# Patient Record
Sex: FEMALE | Race: BLACK OR AFRICAN AMERICAN | NOT HISPANIC OR LATINO | URBAN - METROPOLITAN AREA
[De-identification: names, ages, dates, MRNs, and addresses within clinical notes are randomized per-mention and may not be internally consistent; named-entity substitution may affect disease eponyms.]

---

## 2021-06-21 ENCOUNTER — IMMUNIZATION (OUTPATIENT)
Dept: PRIMARY CARE CLINIC | Facility: CLINIC | Age: 61
End: 2021-06-21
Payer: OTHER GOVERNMENT

## 2021-06-21 DIAGNOSIS — Z23 NEED FOR VACCINATION: Primary | ICD-10-CM

## 2021-06-21 PROCEDURE — 91300 COVID-19, MRNA, LNP-S, PF, 30 MCG/0.3 ML DOSE VACCINE: ICD-10-PCS | Mod: ,,, | Performed by: FAMILY MEDICINE

## 2021-06-21 PROCEDURE — 0001A COVID-19, MRNA, LNP-S, PF, 30 MCG/0.3 ML DOSE VACCINE: ICD-10-PCS | Mod: CV19,,, | Performed by: FAMILY MEDICINE

## 2021-06-21 PROCEDURE — 0001A COVID-19, MRNA, LNP-S, PF, 30 MCG/0.3 ML DOSE VACCINE: CPT | Mod: CV19,,, | Performed by: FAMILY MEDICINE

## 2021-06-21 PROCEDURE — 91300 COVID-19, MRNA, LNP-S, PF, 30 MCG/0.3 ML DOSE VACCINE: CPT | Mod: ,,, | Performed by: FAMILY MEDICINE

## 2021-07-13 ENCOUNTER — IMMUNIZATION (OUTPATIENT)
Dept: INTERNAL MEDICINE | Facility: CLINIC | Age: 61
End: 2021-07-13
Payer: OTHER GOVERNMENT

## 2021-07-13 DIAGNOSIS — Z23 NEED FOR VACCINATION: Primary | ICD-10-CM

## 2021-07-13 PROCEDURE — 91300 COVID-19, MRNA, LNP-S, PF, 30 MCG/0.3 ML DOSE VACCINE: CPT | Mod: ,,, | Performed by: FAMILY MEDICINE

## 2021-07-13 PROCEDURE — 0002A COVID-19, MRNA, LNP-S, PF, 30 MCG/0.3 ML DOSE VACCINE: ICD-10-PCS | Mod: CV19,,, | Performed by: FAMILY MEDICINE

## 2021-07-13 PROCEDURE — 0002A COVID-19, MRNA, LNP-S, PF, 30 MCG/0.3 ML DOSE VACCINE: CPT | Mod: CV19,,, | Performed by: FAMILY MEDICINE

## 2021-07-13 PROCEDURE — 91300 COVID-19, MRNA, LNP-S, PF, 30 MCG/0.3 ML DOSE VACCINE: ICD-10-PCS | Mod: ,,, | Performed by: FAMILY MEDICINE

## 2022-10-10 ENCOUNTER — PATIENT MESSAGE (OUTPATIENT)
Dept: RESEARCH | Facility: HOSPITAL | Age: 62
End: 2022-10-10

## 2025-02-03 ENCOUNTER — HOSPITAL ENCOUNTER (OUTPATIENT)
Facility: HOSPITAL | Age: 65
Discharge: HOME | End: 2025-02-04
Attending: EMERGENCY MEDICINE | Admitting: HOSPITALIST
Payer: COMMERCIAL

## 2025-02-03 DIAGNOSIS — Z91.148 NONCOMPLIANCE WITH DIET AND MEDICATION REGIMEN: ICD-10-CM

## 2025-02-03 DIAGNOSIS — E86.1 INTRAVASCULAR VOLUME DEPLETION: ICD-10-CM

## 2025-02-03 DIAGNOSIS — T73.0XXA STARVATION KETOACIDOSIS: ICD-10-CM

## 2025-02-03 DIAGNOSIS — R73.9 HYPERGLYCEMIA: ICD-10-CM

## 2025-02-03 DIAGNOSIS — E87.29 STARVATION KETOACIDOSIS: ICD-10-CM

## 2025-02-03 DIAGNOSIS — E11.65 HYPERGLYCEMIA DUE TO DIABETES MELLITUS: ICD-10-CM

## 2025-02-03 DIAGNOSIS — E11.65 TYPE 2 DIABETES MELLITUS WITH HYPERGLYCEMIA, WITH LONG-TERM CURRENT USE OF INSULIN: ICD-10-CM

## 2025-02-03 DIAGNOSIS — Z91.199 NONCOMPLIANCE WITH DIET AND MEDICATION REGIMEN: ICD-10-CM

## 2025-02-03 DIAGNOSIS — E87.20 METABOLIC ACIDOSIS, NAG, BICARBONATE LOSSES: Primary | ICD-10-CM

## 2025-02-03 DIAGNOSIS — R07.9 CHEST PAIN: ICD-10-CM

## 2025-02-03 DIAGNOSIS — R11.2 NAUSEA & VOMITING: ICD-10-CM

## 2025-02-03 DIAGNOSIS — Z79.4 TYPE 2 DIABETES MELLITUS WITH HYPERGLYCEMIA, WITH LONG-TERM CURRENT USE OF INSULIN: ICD-10-CM

## 2025-02-03 LAB
ALBUMIN SERPL BCP-MCNC: 4.2 G/DL (ref 3.5–5.2)
ALLENS TEST: ABNORMAL
ALP SERPL-CCNC: 67 U/L (ref 40–150)
ALT SERPL W/O P-5'-P-CCNC: 16 U/L (ref 10–44)
AMPHET+METHAMPHET UR QL: NEGATIVE
ANION GAP SERPL CALC-SCNC: 18 MMOL/L (ref 8–16)
ANION GAP SERPL CALC-SCNC: 23 MMOL/L (ref 8–16)
AST SERPL-CCNC: 11 U/L (ref 10–40)
B-OH-BUTYR BLD STRIP-SCNC: 0.1 MMOL/L (ref 0–0.5)
BACTERIA #/AREA URNS HPF: ABNORMAL /HPF
BARBITURATES UR QL SCN>200 NG/ML: NEGATIVE
BASOPHILS # BLD AUTO: 0.04 K/UL (ref 0–0.2)
BASOPHILS NFR BLD: 0.5 % (ref 0–1.9)
BENZODIAZ UR QL SCN>200 NG/ML: NEGATIVE
BILIRUB SERPL-MCNC: 0.2 MG/DL (ref 0.1–1)
BILIRUB UR QL STRIP: NEGATIVE
BUN SERPL-MCNC: 10 MG/DL (ref 8–23)
BUN SERPL-MCNC: 13 MG/DL (ref 8–23)
BZE UR QL SCN: NEGATIVE
CALCIUM SERPL-MCNC: 8.3 MG/DL (ref 8.7–10.5)
CALCIUM SERPL-MCNC: 8.9 MG/DL (ref 8.7–10.5)
CANNABINOIDS UR QL SCN: NEGATIVE
CAOX CRY URNS QL MICRO: ABNORMAL
CHLORIDE SERPL-SCNC: 102 MMOL/L (ref 95–110)
CHLORIDE SERPL-SCNC: 112 MMOL/L (ref 95–110)
CLARITY UR: CLEAR
CO2 SERPL-SCNC: 11 MMOL/L (ref 23–29)
CO2 SERPL-SCNC: 9 MMOL/L (ref 23–29)
COLOR UR: COLORLESS
CREAT SERPL-MCNC: 0.8 MG/DL (ref 0.5–1.4)
CREAT SERPL-MCNC: 1.1 MG/DL (ref 0.5–1.4)
CREAT UR-MCNC: 18.2 MG/DL (ref 15–325)
DELSYS: ABNORMAL
DIFFERENTIAL METHOD BLD: ABNORMAL
EOSINOPHIL # BLD AUTO: 0 K/UL (ref 0–0.5)
EOSINOPHIL NFR BLD: 0.1 % (ref 0–8)
ERYTHROCYTE [DISTWIDTH] IN BLOOD BY AUTOMATED COUNT: 13.2 % (ref 11.5–14.5)
EST. GFR  (NO RACE VARIABLE): 56 ML/MIN/1.73 M^2
EST. GFR  (NO RACE VARIABLE): >60 ML/MIN/1.73 M^2
ETHANOL SERPL-MCNC: <10 MG/DL
FIO2: 21
GLUCOSE SERPL-MCNC: 213 MG/DL (ref 70–110)
GLUCOSE SERPL-MCNC: 437 MG/DL (ref 70–110)
GLUCOSE SERPL-MCNC: 444 MG/DL (ref 70–110)
GLUCOSE UR QL STRIP: ABNORMAL
HCO3 UR-SCNC: 13.2 MMOL/L (ref 24–28)
HCT VFR BLD AUTO: 39.6 % (ref 37–48.5)
HCT VFR BLD CALC: 39 %PCV (ref 36–54)
HGB BLD-MCNC: 12.8 G/DL (ref 12–16)
HGB UR QL STRIP: NEGATIVE
IMM GRANULOCYTES # BLD AUTO: 0.05 K/UL (ref 0–0.04)
IMM GRANULOCYTES NFR BLD AUTO: 0.6 % (ref 0–0.5)
KETONES UR QL STRIP: NEGATIVE
LACTATE SERPL-SCNC: 2.7 MMOL/L (ref 0.5–2.2)
LEUKOCYTE ESTERASE UR QL STRIP: NEGATIVE
LIPASE SERPL-CCNC: 21 U/L (ref 4–60)
LYMPHOCYTES # BLD AUTO: 1.5 K/UL (ref 1–4.8)
LYMPHOCYTES NFR BLD: 17.6 % (ref 18–48)
MAGNESIUM SERPL-MCNC: 1.8 MG/DL (ref 1.6–2.6)
MCH RBC QN AUTO: 29.7 PG (ref 27–31)
MCHC RBC AUTO-ENTMCNC: 32.3 G/DL (ref 32–36)
MCV RBC AUTO: 92 FL (ref 82–98)
METHADONE UR QL SCN>300 NG/ML: NEGATIVE
MICROSCOPIC COMMENT: ABNORMAL
MODE: ABNORMAL
MONOCYTES # BLD AUTO: 0.4 K/UL (ref 0.3–1)
MONOCYTES NFR BLD: 4.7 % (ref 4–15)
NEUTROPHILS # BLD AUTO: 6.4 K/UL (ref 1.8–7.7)
NEUTROPHILS NFR BLD: 76.5 % (ref 38–73)
NITRITE UR QL STRIP: NEGATIVE
NRBC BLD-RTO: 0 /100 WBC
OHS QRS DURATION: 120 MS
OHS QTC CALCULATION: 533 MS
OPIATES UR QL SCN: NEGATIVE
PCO2 BLDA: 26.8 MMHG (ref 35–45)
PCP UR QL SCN>25 NG/ML: NEGATIVE
PH SMN: 7.3 [PH] (ref 7.35–7.45)
PH UR STRIP: 5 [PH] (ref 5–8)
PLATELET # BLD AUTO: 325 K/UL (ref 150–450)
PMV BLD AUTO: 10 FL (ref 9.2–12.9)
PO2 BLDA: 58 MMHG (ref 40–60)
POC BE: -13 MMOL/L
POC IONIZED CALCIUM: 1.14 MMOL/L (ref 1.06–1.42)
POC SATURATED O2: 87 % (ref 95–100)
POCT GLUCOSE: 105 MG/DL (ref 70–110)
POCT GLUCOSE: 147 MG/DL (ref 70–110)
POCT GLUCOSE: 215 MG/DL (ref 70–110)
POCT GLUCOSE: 421 MG/DL (ref 70–110)
POCT GLUCOSE: 475 MG/DL (ref 70–110)
POTASSIUM BLD-SCNC: 4.5 MMOL/L (ref 3.5–5.1)
POTASSIUM SERPL-SCNC: 4.5 MMOL/L (ref 3.5–5.1)
POTASSIUM SERPL-SCNC: 5 MMOL/L (ref 3.5–5.1)
PROT SERPL-MCNC: 8 G/DL (ref 6–8.4)
PROT UR QL STRIP: NEGATIVE
RBC # BLD AUTO: 4.31 M/UL (ref 4–5.4)
SAMPLE: ABNORMAL
SITE: ABNORMAL
SODIUM BLD-SCNC: 136 MMOL/L (ref 136–145)
SODIUM SERPL-SCNC: 136 MMOL/L (ref 136–145)
SODIUM SERPL-SCNC: 139 MMOL/L (ref 136–145)
SP GR UR STRIP: 1.01 (ref 1–1.03)
T4 FREE SERPL-MCNC: 1.43 NG/DL (ref 0.71–1.51)
TOXICOLOGY INFORMATION: NORMAL
TSH SERPL DL<=0.005 MIU/L-ACNC: <0.01 UIU/ML (ref 0.4–4)
URN SPEC COLLECT METH UR: ABNORMAL
UROBILINOGEN UR STRIP-ACNC: NEGATIVE EU/DL
WBC # BLD AUTO: 8.36 K/UL (ref 3.9–12.7)
WBC #/AREA URNS HPF: 1 /HPF (ref 0–5)
YEAST URNS QL MICRO: ABNORMAL

## 2025-02-03 PROCEDURE — 96372 THER/PROPH/DIAG INJ SC/IM: CPT | Performed by: EMERGENCY MEDICINE

## 2025-02-03 PROCEDURE — 96361 HYDRATE IV INFUSION ADD-ON: CPT

## 2025-02-03 PROCEDURE — G0378 HOSPITAL OBSERVATION PER HR: HCPCS

## 2025-02-03 PROCEDURE — 82962 GLUCOSE BLOOD TEST: CPT

## 2025-02-03 PROCEDURE — 82330 ASSAY OF CALCIUM: CPT

## 2025-02-03 PROCEDURE — 80048 BASIC METABOLIC PNL TOTAL CA: CPT | Mod: XB | Performed by: EMERGENCY MEDICINE

## 2025-02-03 PROCEDURE — 25500020 PHARM REV CODE 255: Performed by: HOSPITALIST

## 2025-02-03 PROCEDURE — 83605 ASSAY OF LACTIC ACID: CPT | Performed by: EMERGENCY MEDICINE

## 2025-02-03 PROCEDURE — 96375 TX/PRO/DX INJ NEW DRUG ADDON: CPT

## 2025-02-03 PROCEDURE — 83036 HEMOGLOBIN GLYCOSYLATED A1C: CPT | Performed by: EMERGENCY MEDICINE

## 2025-02-03 PROCEDURE — 63600175 PHARM REV CODE 636 W HCPCS: Performed by: EMERGENCY MEDICINE

## 2025-02-03 PROCEDURE — 36415 COLL VENOUS BLD VENIPUNCTURE: CPT | Performed by: EMERGENCY MEDICINE

## 2025-02-03 PROCEDURE — 93005 ELECTROCARDIOGRAM TRACING: CPT

## 2025-02-03 PROCEDURE — 25000003 PHARM REV CODE 250: Performed by: NURSE PRACTITIONER

## 2025-02-03 PROCEDURE — 84443 ASSAY THYROID STIM HORMONE: CPT | Performed by: EMERGENCY MEDICINE

## 2025-02-03 PROCEDURE — 99900035 HC TECH TIME PER 15 MIN (STAT)

## 2025-02-03 PROCEDURE — 84295 ASSAY OF SERUM SODIUM: CPT

## 2025-02-03 PROCEDURE — 83690 ASSAY OF LIPASE: CPT | Performed by: EMERGENCY MEDICINE

## 2025-02-03 PROCEDURE — 84132 ASSAY OF SERUM POTASSIUM: CPT

## 2025-02-03 PROCEDURE — 82803 BLOOD GASES ANY COMBINATION: CPT

## 2025-02-03 PROCEDURE — 25500020 PHARM REV CODE 255: Performed by: EMERGENCY MEDICINE

## 2025-02-03 PROCEDURE — 93010 ELECTROCARDIOGRAM REPORT: CPT | Mod: ,,, | Performed by: INTERNAL MEDICINE

## 2025-02-03 PROCEDURE — 63600175 PHARM REV CODE 636 W HCPCS: Performed by: NURSE PRACTITIONER

## 2025-02-03 PROCEDURE — 96372 THER/PROPH/DIAG INJ SC/IM: CPT | Performed by: NURSE PRACTITIONER

## 2025-02-03 PROCEDURE — 82010 KETONE BODYS QUAN: CPT | Performed by: EMERGENCY MEDICINE

## 2025-02-03 PROCEDURE — 83735 ASSAY OF MAGNESIUM: CPT | Performed by: EMERGENCY MEDICINE

## 2025-02-03 PROCEDURE — 25000003 PHARM REV CODE 250: Performed by: EMERGENCY MEDICINE

## 2025-02-03 PROCEDURE — 81000 URINALYSIS NONAUTO W/SCOPE: CPT | Performed by: EMERGENCY MEDICINE

## 2025-02-03 PROCEDURE — 80053 COMPREHEN METABOLIC PANEL: CPT | Performed by: EMERGENCY MEDICINE

## 2025-02-03 PROCEDURE — 85025 COMPLETE CBC W/AUTO DIFF WBC: CPT | Performed by: EMERGENCY MEDICINE

## 2025-02-03 PROCEDURE — 82077 ASSAY SPEC XCP UR&BREATH IA: CPT | Performed by: EMERGENCY MEDICINE

## 2025-02-03 PROCEDURE — 84439 ASSAY OF FREE THYROXINE: CPT | Performed by: EMERGENCY MEDICINE

## 2025-02-03 PROCEDURE — 85014 HEMATOCRIT: CPT

## 2025-02-03 PROCEDURE — 99285 EMERGENCY DEPT VISIT HI MDM: CPT | Mod: 25

## 2025-02-03 PROCEDURE — 80307 DRUG TEST PRSMV CHEM ANLYZR: CPT | Performed by: EMERGENCY MEDICINE

## 2025-02-03 PROCEDURE — 96374 THER/PROPH/DIAG INJ IV PUSH: CPT | Mod: 59

## 2025-02-03 RX ORDER — ENOXAPARIN SODIUM 100 MG/ML
40 INJECTION SUBCUTANEOUS EVERY 24 HOURS
Status: DISCONTINUED | OUTPATIENT
Start: 2025-02-03 | End: 2025-02-04 | Stop reason: HOSPADM

## 2025-02-03 RX ORDER — FUROSEMIDE 20 MG/1
1 TABLET ORAL DAILY
COMMUNITY
Start: 2024-12-05

## 2025-02-03 RX ORDER — TIRZEPATIDE 2.5 MG/.5ML
2.5 INJECTION, SOLUTION SUBCUTANEOUS
COMMUNITY
Start: 2024-05-13

## 2025-02-03 RX ORDER — IBUPROFEN 200 MG
24 TABLET ORAL
Status: DISCONTINUED | OUTPATIENT
Start: 2025-02-03 | End: 2025-02-04 | Stop reason: HOSPADM

## 2025-02-03 RX ORDER — SODIUM CHLORIDE 0.9 % (FLUSH) 0.9 %
3 SYRINGE (ML) INJECTION EVERY 12 HOURS PRN
Status: DISCONTINUED | OUTPATIENT
Start: 2025-02-03 | End: 2025-02-04 | Stop reason: HOSPADM

## 2025-02-03 RX ORDER — IBUPROFEN 200 MG
16 TABLET ORAL
Status: DISCONTINUED | OUTPATIENT
Start: 2025-02-03 | End: 2025-02-04 | Stop reason: HOSPADM

## 2025-02-03 RX ORDER — ACETAMINOPHEN 650 MG/1
650 SUPPOSITORY RECTAL EVERY 6 HOURS PRN
Status: DISCONTINUED | OUTPATIENT
Start: 2025-02-03 | End: 2025-02-04 | Stop reason: HOSPADM

## 2025-02-03 RX ORDER — ALUMINUM HYDROXIDE, MAGNESIUM HYDROXIDE, AND SIMETHICONE 1200; 120; 1200 MG/30ML; MG/30ML; MG/30ML
30 SUSPENSION ORAL 4 TIMES DAILY PRN
Status: DISCONTINUED | OUTPATIENT
Start: 2025-02-03 | End: 2025-02-04 | Stop reason: HOSPADM

## 2025-02-03 RX ORDER — LISINOPRIL 5 MG/1
1 TABLET ORAL DAILY
COMMUNITY
Start: 2024-12-03

## 2025-02-03 RX ORDER — GLUCAGON 1 MG
1 KIT INJECTION
Status: DISCONTINUED | OUTPATIENT
Start: 2025-02-03 | End: 2025-02-04 | Stop reason: HOSPADM

## 2025-02-03 RX ORDER — SIMETHICONE 80 MG
1 TABLET,CHEWABLE ORAL 4 TIMES DAILY PRN
Status: DISCONTINUED | OUTPATIENT
Start: 2025-02-03 | End: 2025-02-04 | Stop reason: HOSPADM

## 2025-02-03 RX ORDER — ACETAMINOPHEN 650 MG/1
650 SUPPOSITORY RECTAL EVERY 6 HOURS PRN
Status: DISCONTINUED | OUTPATIENT
Start: 2025-02-03 | End: 2025-02-03 | Stop reason: SDUPTHER

## 2025-02-03 RX ORDER — PROMETHAZINE HYDROCHLORIDE 25 MG/1
25 TABLET ORAL EVERY 6 HOURS PRN
Status: DISCONTINUED | OUTPATIENT
Start: 2025-02-03 | End: 2025-02-04 | Stop reason: HOSPADM

## 2025-02-03 RX ORDER — LEVOTHYROXINE SODIUM 125 UG/1
1 TABLET ORAL EVERY MORNING
COMMUNITY
Start: 2024-07-22

## 2025-02-03 RX ORDER — METFORMIN HYDROCHLORIDE 500 MG/1
1000 TABLET, EXTENDED RELEASE ORAL 2 TIMES DAILY WITH MEALS
COMMUNITY
Start: 2024-08-23

## 2025-02-03 RX ORDER — SODIUM CHLORIDE 9 MG/ML
1000 INJECTION, SOLUTION INTRAVENOUS
Status: COMPLETED | OUTPATIENT
Start: 2025-02-03 | End: 2025-02-03

## 2025-02-03 RX ORDER — AMLODIPINE BESYLATE 5 MG/1
1 TABLET ORAL DAILY
COMMUNITY
Start: 2024-12-03

## 2025-02-03 RX ORDER — POLYETHYLENE GLYCOL 3350 17 G/17G
17 POWDER, FOR SOLUTION ORAL DAILY PRN
Status: DISCONTINUED | OUTPATIENT
Start: 2025-02-03 | End: 2025-02-04 | Stop reason: HOSPADM

## 2025-02-03 RX ORDER — ACETAMINOPHEN 325 MG/1
650 TABLET ORAL EVERY 6 HOURS PRN
Status: DISCONTINUED | OUTPATIENT
Start: 2025-02-03 | End: 2025-02-04 | Stop reason: HOSPADM

## 2025-02-03 RX ORDER — NALOXONE HCL 0.4 MG/ML
0.02 VIAL (ML) INJECTION
Status: DISCONTINUED | OUTPATIENT
Start: 2025-02-03 | End: 2025-02-04 | Stop reason: HOSPADM

## 2025-02-03 RX ORDER — TALC
6 POWDER (GRAM) TOPICAL NIGHTLY PRN
Status: DISCONTINUED | OUTPATIENT
Start: 2025-02-03 | End: 2025-02-04 | Stop reason: HOSPADM

## 2025-02-03 RX ORDER — INSULIN ASPART 100 [IU]/ML
0-5 INJECTION, SOLUTION INTRAVENOUS; SUBCUTANEOUS
Status: DISCONTINUED | OUTPATIENT
Start: 2025-02-03 | End: 2025-02-04 | Stop reason: HOSPADM

## 2025-02-03 RX ORDER — INDOMETHACIN 25 MG/1
100 CAPSULE ORAL
Status: COMPLETED | OUTPATIENT
Start: 2025-02-03 | End: 2025-02-03

## 2025-02-03 RX ORDER — SODIUM CHLORIDE 9 MG/ML
INJECTION, SOLUTION INTRAVENOUS CONTINUOUS
Status: DISCONTINUED | OUTPATIENT
Start: 2025-02-03 | End: 2025-02-04 | Stop reason: HOSPADM

## 2025-02-03 RX ORDER — ONDANSETRON HYDROCHLORIDE 2 MG/ML
4 INJECTION, SOLUTION INTRAVENOUS EVERY 8 HOURS PRN
Status: DISCONTINUED | OUTPATIENT
Start: 2025-02-03 | End: 2025-02-04 | Stop reason: HOSPADM

## 2025-02-03 RX ADMIN — HUMAN INSULIN 8 UNITS: 100 INJECTION, SOLUTION SUBCUTANEOUS at 01:02

## 2025-02-03 RX ADMIN — ENOXAPARIN SODIUM 40 MG: 40 INJECTION SUBCUTANEOUS at 10:02

## 2025-02-03 RX ADMIN — IOHEXOL 100 ML: 350 INJECTION, SOLUTION INTRAVENOUS at 05:02

## 2025-02-03 RX ADMIN — SODIUM CHLORIDE 2000 ML: 9 INJECTION, SOLUTION INTRAVENOUS at 12:02

## 2025-02-03 RX ADMIN — SODIUM BICARBONATE 100 MEQ: 84 INJECTION, SOLUTION INTRAVENOUS at 05:02

## 2025-02-03 RX ADMIN — IOHEXOL 100 ML: 350 INJECTION, SOLUTION INTRAVENOUS at 08:02

## 2025-02-03 RX ADMIN — SODIUM CHLORIDE 1000 ML: 9 INJECTION, SOLUTION INTRAVENOUS at 05:02

## 2025-02-03 RX ADMIN — SODIUM CHLORIDE: 9 INJECTION, SOLUTION INTRAVENOUS at 09:02

## 2025-02-03 NOTE — PHARMACY MED REC
"Admission Medication History     The home medication history was taken by Jass Slaughter.    You may go to "Admission" then "Reconcile Home Medications" tabs to review and/or act upon these items.     The home medication list has been updated by the Pharmacy department.   Please read ALL comments highlighted in yellow.   Please address this information as you see fit.    Feel free to contact us if you have any questions or require assistance.      Medications listed below were obtained from: Patient/family and Analytic software- [employer]  (Not in a hospital admission)      Jass Slaughter  [id number]    Current Outpatient Medications on File Prior to Encounter   Medication Sig Dispense Refill Last Dose/Taking    amLODIPine (NORVASC) 5 MG tablet Take 1 tablet by mouth once daily.   2/3/2025    furosemide (LASIX) 20 MG tablet Take 1 tablet by mouth once daily.   2/3/2025    insulin detemir U-100 (LEVEMIR) 100 unit/mL injection Inject 25 Units into the skin 2 (two) times a day.   2/2/2025    levothyroxine (SYNTHROID) 125 MCG tablet Take 1 tablet by mouth every morning.   2/3/2025    lisinopriL (PRINIVIL,ZESTRIL) 5 MG tablet Take 1 tablet by mouth once daily.   2/3/2025    metFORMIN (GLUCOPHAGE-XR) 500 MG ER 24hr tablet Take 1,000 mg by mouth 2 (two) times daily with meals.   2/3/2025    tirzepatide (MOUNJARO) 2.5 mg/0.5 mL PnIj Inject 2.5 mg into the skin every 7 days.   Past Week                         .          "

## 2025-02-03 NOTE — ED PROVIDER NOTES
"SCRIBE #1 NOTE: I, Jaime Bishop, am scribing for, and in the presence of, Jaleesa Marc MD. I have scribed the entire note.       History     Chief Complaint   Patient presents with    Hyperglycemia     Pt. Reports her CBG was 568 this morning and she was having weakness.      Review of patient's allergies indicates:  No Known Allergies      History of Present Illness     HPI    2/3/2025, 12:27 PM  History obtained from the patient and sister at bedside      History of Present Illness: Birgit Tejada is a 64 y.o. female patient with a PMHx of DM and HTN who presents to the Emergency Department for evaluation of elevated blood glucose which onset gradually throughout the past day. Pt's blood glucose was over 500 this morning; pt reports glucose has been running high since yesterday. Pt's last insulin intake was yesterday night. Reports being unable to take insulin this morning. Pt denies eating any food, however, she did drink root beer and coffee prior to going to work at [employer]. Pt reports that her blood glucose normally runs in the 200s. Symptoms are constant and moderate in severity. No mitigating or exacerbating factors reported. Associated sxs include dizziness and described it as feeling "drunk." Pt denies drinking any EtOH this morning. Pt reports she did drink wine and only ate crawfish yesterday. Patient denies any dysuria, abd pain, fever, CP, SOB, N/V, and all other sxs at this time. No further complaints or concerns at this time.       Arrival mode: Personal vehicle      PCP: Ryan Gu MD        Past Medical History:  Past Medical History:   Diagnosis Date    Diabetes mellitus     Hypertension        Past Surgical History:  History reviewed. No pertinent surgical history.      Family History:  No family history on file.    Social History:  Social History     Tobacco Use    Smoking status: Never    Smokeless tobacco: Never   Substance and Sexual Activity    Alcohol use: Yes     " Comment: Occasional    Drug use: Never    Sexual activity: Not on file        Review of Systems     Review of Systems   Constitutional:  Negative for chills and fever.   HENT:  Negative for congestion and sore throat.    Respiratory:  Negative for cough and shortness of breath.    Cardiovascular:  Negative for chest pain.   Gastrointestinal:  Negative for abdominal pain, nausea and vomiting.   Genitourinary:  Negative for dysuria.   Musculoskeletal:  Negative for back pain.   Skin:  Negative for rash.   Neurological:  Positive for dizziness. Negative for weakness, numbness and headaches.   Hematological:  Does not bruise/bleed easily.   All other systems reviewed and are negative.       Physical Exam     Initial Vitals [02/03/25 1108]   BP Pulse Resp Temp SpO2   137/79 (!) 128 18 98.3 °F (36.8 °C) 97 %      MAP       --          Physical Exam  Nursing Notes and Vital Signs Reviewed.  Constitutional: Patient is in no acute distress. Intermittently spitting in emesis bag but no actual emesis.  Head: Atraumatic. Normocephalic.  Eyes: PERRL. EOM intact. Conjunctivae are not pale. No scleral icterus.  ENT: Mucous membranes are moist. Oropharynx is clear and symmetric.    Neck: Supple. Full ROM. No lymphadenopathy.  Cardiovascular: Tachycardic. Regular rhythm. No murmurs, rubs, or gallops. Distal pulses are 2+ and symmetric.  Pulmonary/Chest: No respiratory distress. Clear to auscultation bilaterally. No wheezing or rales.  Abdominal: Soft and non-distended.  There is no tenderness.  No rebound, guarding, or rigidity. Good bowel sounds.  Genitourinary: No CVA tenderness  Musculoskeletal: Moves all extremities. No obvious deformities. No edema. No calf tenderness.  Skin: Warm and dry.  Neurological:  Alert, awake, and appropriate.  Normal speech.  No pronator drift. Equal  strength. Tongue is midline. No facial droop. No acute focal neurological deficits are appreciated.  Psychiatric: Normal affect. Good eye contact.  "Appropriate in content.     ED Course   Critical Care    Date/Time: 2/3/2025 7:00 PM    Performed by: Jaleesa Marc MD  Authorized by: Jaleesa Marc MD  Direct patient critical care time: 50 minutes  Additional history critical care time: 6 minutes  Ordering / reviewing critical care time: 7 minutes  Documentation critical care time: 7 minutes  Consulting other physicians critical care time: 8 minutes  Total critical care time (exclusive of procedural time) : 78 minutes  Critical care was necessary to treat or prevent imminent or life-threatening deterioration of the following conditions: dehydration, metabolic crisis and endocrine crisis.  Critical care was time spent personally by me on the following activities: blood draw for specimens, discussions with consultants, discussions with primary provider, development of treatment plan with patient or surrogate, interpretation of cardiac output measurements, evaluation of patient's response to treatment, examination of patient, obtaining history from patient or surrogate, ordering and performing treatments and interventions, ordering and review of laboratory studies, ordering and review of radiographic studies, pulse oximetry, re-evaluation of patient's condition and review of old charts.        ED Vital Signs:  Vitals:    02/03/25 1902 02/03/25 1946 02/03/25 1958 02/03/25 2058   BP: (!) 141/82  (!) 150/90 (!) 143/80   Pulse: 94  89 84   Resp:   (!) 23 17   Temp:       TempSrc:       SpO2: 98%  97% 97%   Weight:       Height:  5' 7" (1.702 m)      02/03/25 2139 02/03/25 2145 02/03/25 2300 02/04/25 0023   BP: 135/66   136/71   Pulse: 92 82 86 88   Resp: 18   18   Temp: 98.5 °F (36.9 °C)   98.2 °F (36.8 °C)   TempSrc:    Oral   SpO2: 97%   95%   Weight:       Height:        02/04/25 0100 02/04/25 0331 02/04/25 0422 02/04/25 0500   BP:       Pulse: 86 93 110 86   Resp:       Temp:       TempSrc:       SpO2:       Weight:       Height:        02/04/25 0530 02/04/25 " 0809 02/04/25 1024   BP: 124/78 (!) 152/76    Pulse: 99 89 92   Resp: 16 16    Temp: 98 °F (36.7 °C) 98.6 °F (37 °C)    TempSrc: Oral Oral    SpO2: 98% 99% 97%   Weight:      Height:          Abnormal Lab Results:  Labs Reviewed   CBC W/ AUTO DIFFERENTIAL - Abnormal       Result Value    WBC 8.36      RBC 4.31      Hemoglobin 12.8      Hematocrit 39.6      MCV 92      MCH 29.7      MCHC 32.3      RDW 13.2      Platelets 325      MPV 10.0      Immature Granulocytes 0.6 (*)     Gran # (ANC) 6.4      Immature Grans (Abs) 0.05 (*)     Lymph # 1.5      Mono # 0.4      Eos # 0.0      Baso # 0.04      nRBC 0      Gran % 76.5 (*)     Lymph % 17.6 (*)     Mono % 4.7      Eosinophil % 0.1      Basophil % 0.5      Differential Method Automated     COMPREHENSIVE METABOLIC PANEL - Abnormal    Sodium 136      Potassium 4.5      Chloride 102      CO2 11 (*)     Glucose 444 (*)     BUN 13      Creatinine 1.1      Calcium 8.9      Total Protein 8.0      Albumin 4.2      Total Bilirubin 0.2      Alkaline Phosphatase 67      AST 11      ALT 16      eGFR 56 (*)     Anion Gap 23 (*)    URINALYSIS, REFLEX TO URINE CULTURE - Abnormal    Specimen UA Urine, Clean Catch      Color, UA Colorless (*)     Appearance, UA Clear      pH, UA 5.0      Specific Gravity, UA 1.015      Protein, UA Negative      Glucose, UA 4+ (*)     Ketones, UA Negative      Bilirubin (UA) Negative      Occult Blood UA Negative      Nitrite, UA Negative      Urobilinogen, UA Negative      Leukocytes, UA Negative      Narrative:     Specimen Source->Urine   TSH - Abnormal    TSH <0.010 (*)    BASIC METABOLIC PANEL - Abnormal    Sodium 139      Potassium 5.0      Chloride 112 (*)     CO2 9 (*)     Glucose 213 (*)     BUN 10      Creatinine 0.8      Calcium 8.3 (*)     Anion Gap 18 (*)     eGFR >60      Narrative:      co2  critical result(s) called and verbal readback obtained from   paty villegas rn by EVELYN 02/03/2025 15:48   URINALYSIS MICROSCOPIC - Abnormal    WBC,  UA 1      Bacteria Rare      Yeast, UA None      Ca Oxalate Mai, UA Many (*)     Microscopic Comment SEE COMMENT      Narrative:     Specimen Source->Urine   LACTIC ACID, PLASMA - Abnormal    Lactate (Lactic Acid) 2.7 (*)    POCT GLUCOSE - Abnormal    POCT Glucose 475 (*)    POCT GLUCOSE - Abnormal    POCT Glucose 421 (*)    ISTAT PROCEDURE - Abnormal    POC PH 7.301 (*)     POC PCO2 26.8 (*)     POC PO2 58      POC HCO3 13.2 (*)     POC BE -13 (*)     POC SATURATED O2 87      POC Glucose 437 (*)     POC Sodium 136      POC Potassium 4.5      POC Ionized Calcium 1.14      POC Hematocrit 39      Sample VENOUS      Site Other      Allens Test N/A      DelSys Room Air      Mode SPONT      FiO2 21     POCT GLUCOSE - Abnormal    POCT Glucose 215 (*)    BETA - HYDROXYBUTYRATE, SERUM    Beta-Hydroxybutyrate 0.1     MAGNESIUM    Magnesium 1.8     DRUG SCREEN PANEL, URINE EMERGENCY    Benzodiazepines Negative      Methadone metabolites Negative      Cocaine (Metab.) Negative      Opiate Scrn, Ur Negative      Barbiturate Screen, Ur Negative      Amphetamine Screen, Ur Negative      THC Negative      Phencyclidine Negative      Creatinine, Urine 18.2      Toxicology Information SEE COMMENT      Narrative:     Specimen Source->Urine   ALCOHOL,MEDICAL (ETHANOL)   T4, FREE    Free T4 1.43     LIPASE   LIPASE    Lipase 21     ALCOHOL,MEDICAL (ETHANOL)    Alcohol, Serum <10     POCT GLUCOSE    POCT Glucose 105     POCT GLUCOSE MONITORING CONTINUOUS   POCT GLUCOSE MONITORING CONTINUOUS   POCT GLUCOSE MONITORING CONTINUOUS   POCT GLUCOSE MONITORING CONTINUOUS        All Lab Results:  Results for orders placed or performed during the hospital encounter of 02/03/25   POCT glucose    Collection Time: 02/03/25 11:15 AM   Result Value Ref Range    POCT Glucose 475 (HH) 70 - 110 mg/dL   EKG 12-lead    Collection Time: 02/03/25 11:56 AM   Result Value Ref Range    QRS Duration 120 ms    [facility] QTC Calculation 533 ms   POCT glucose     Collection Time: 02/03/25 12:25 PM   Result Value Ref Range    POCT Glucose 421 (H) 70 - 110 mg/dL   CBC auto differential    Collection Time: 02/03/25 12:28 PM   Result Value Ref Range    WBC 8.36 3.90 - 12.70 K/uL    RBC 4.31 4.00 - 5.40 M/uL    Hemoglobin 12.8 12.0 - 16.0 g/dL    Hematocrit 39.6 37.0 - 48.5 %    MCV 92 82 - 98 fL    MCH 29.7 27.0 - 31.0 pg    MCHC 32.3 32.0 - 36.0 g/dL    RDW 13.2 11.5 - 14.5 %    Platelets 325 150 - 450 K/uL    MPV 10.0 9.2 - 12.9 fL    Immature Granulocytes 0.6 (H) 0.0 - 0.5 %    Gran # (ANC) 6.4 1.8 - 7.7 K/uL    Immature Grans (Abs) 0.05 (H) 0.00 - 0.04 K/uL    Lymph # 1.5 1.0 - 4.8 K/uL    Mono # 0.4 0.3 - 1.0 K/uL    Eos # 0.0 0.0 - 0.5 K/uL    Baso # 0.04 0.00 - 0.20 K/uL    nRBC 0 0 /100 WBC    Gran % 76.5 (H) 38.0 - 73.0 %    Lymph % 17.6 (L) 18.0 - 48.0 %    Mono % 4.7 4.0 - 15.0 %    Eosinophil % 0.1 0.0 - 8.0 %    Basophil % 0.5 0.0 - 1.9 %    Differential Method Automated    Comprehensive metabolic panel    Collection Time: 02/03/25 12:28 PM   Result Value Ref Range    Sodium 136 136 - 145 mmol/L    Potassium 4.5 3.5 - 5.1 mmol/L    Chloride 102 95 - 110 mmol/L    CO2 11 (L) 23 - 29 mmol/L    Glucose 444 (H) 70 - 110 mg/dL    BUN 13 8 - 23 mg/dL    Creatinine 1.1 0.5 - 1.4 mg/dL    Calcium 8.9 8.7 - 10.5 mg/dL    Total Protein 8.0 6.0 - 8.4 g/dL    Albumin 4.2 3.5 - 5.2 g/dL    Total Bilirubin 0.2 0.1 - 1.0 mg/dL    Alkaline Phosphatase 67 40 - 150 U/L    AST 11 10 - 40 U/L    ALT 16 10 - 44 U/L    eGFR 56 (A) >60 mL/min/1.73 m^2    Anion Gap 23 (H) 8 - 16 mmol/L   Beta - Hydroxybutyrate, Serum    Collection Time: 02/03/25 12:28 PM   Result Value Ref Range    Beta-Hydroxybutyrate 0.1 0.0 - 0.5 mmol/L   Magnesium    Collection Time: 02/03/25 12:28 PM   Result Value Ref Range    Magnesium 1.8 1.6 - 2.6 mg/dL   Hemoglobin A1c    Collection Time: 02/03/25 12:28 PM   Result Value Ref Range    Hemoglobin A1C 8.6 (H) 4.0 - 5.6 %    Estimated Avg Glucose 200 (H) 68 - 131  mg/dL   TSH    Collection Time: 02/03/25 12:28 PM   Result Value Ref Range    TSH <0.010 (L) 0.400 - 4.000 uIU/mL   T4, Free    Collection Time: 02/03/25 12:28 PM   Result Value Ref Range    Free T4 1.43 0.71 - 1.51 ng/dL   Lipase    Collection Time: 02/03/25 12:28 PM   Result Value Ref Range    Lipase 21 4 - 60 U/L   Ethanol    Collection Time: 02/03/25 12:28 PM   Result Value Ref Range    Alcohol, Serum <10 <10 mg/dL   ISTAT PROCEDURE    Collection Time: 02/03/25 12:32 PM   Result Value Ref Range    POC PH 7.301 (L) 7.35 - 7.45    POC PCO2 26.8 (L) 35 - 45 mmHg    POC PO2 58 40 - 60 mmHg    POC HCO3 13.2 (L) 24 - 28 mmol/L    POC BE -13 (L) -2 to 2 mmol/L    POC SATURATED O2 87 95 - 100 %    POC Glucose 437 (H) 70 - 110 mg/dL    POC Sodium 136 136 - 145 mmol/L    POC Potassium 4.5 3.5 - 5.1 mmol/L    POC Ionized Calcium 1.14 1.06 - 1.42 mmol/L    POC Hematocrit 39 36 - 54 %PCV    Sample VENOUS     Site Other     Allens Test N/A     DelSys Room Air     Mode SPONT     FiO2 21    Basic metabolic panel    Collection Time: 02/03/25  3:02 PM   Result Value Ref Range    Sodium 139 136 - 145 mmol/L    Potassium 5.0 3.5 - 5.1 mmol/L    Chloride 112 (H) 95 - 110 mmol/L    CO2 9 (LL) 23 - 29 mmol/L    Glucose 213 (H) 70 - 110 mg/dL    BUN 10 8 - 23 mg/dL    Creatinine 0.8 0.5 - 1.4 mg/dL    Calcium 8.3 (L) 8.7 - 10.5 mg/dL    Anion Gap 18 (H) 8 - 16 mmol/L    eGFR >60 >60 mL/min/1.73 m^2   Urinalysis, Reflex to Urine Culture Urine, Clean Catch    Collection Time: 02/03/25  3:07 PM    Specimen: Urine   Result Value Ref Range    Specimen UA Urine, Clean Catch     Color, UA Colorless (A) Yellow, Straw, Michelle    Appearance, UA Clear Clear    pH, UA 5.0 5.0 - 8.0    Specific Gravity, UA 1.015 1.005 - 1.030    Protein, UA Negative Negative    Glucose, UA 4+ (A) Negative    Ketones, UA Negative Negative    Bilirubin (UA) Negative Negative    Occult Blood UA Negative Negative    Nitrite, UA Negative Negative    Urobilinogen, UA  Negative <2.0 EU/dL    Leukocytes, UA Negative Negative   Urinalysis Microscopic    Collection Time: 02/03/25  3:07 PM   Result Value Ref Range    WBC, UA 1 0 - 5 /hpf    Bacteria Rare None-Occ /hpf    Yeast, UA None None    Ca Oxalate Mai, UA Many (A) None-Moderate    Microscopic Comment SEE COMMENT    Drug screen panel, emergency    Collection Time: 02/03/25  3:08 PM   Result Value Ref Range    Benzodiazepines Negative Negative    Methadone metabolites Negative Negative    Cocaine (Metab.) Negative Negative    Opiate Scrn, Ur Negative Negative    Barbiturate Screen, Ur Negative Negative    Amphetamine Screen, Ur Negative Negative    THC Negative Negative    Phencyclidine Negative Negative    Creatinine, Urine 18.2 15.0 - 325.0 mg/dL    Toxicology Information SEE COMMENT    POCT glucose    Collection Time: 02/03/25  3:11 PM   Result Value Ref Range    POCT Glucose 215 (H) 70 - 110 mg/dL   Lactic acid, plasma    Collection Time: 02/03/25  4:34 PM   Result Value Ref Range    Lactate (Lactic Acid) 2.7 (H) 0.5 - 2.2 mmol/L   POCT glucose    Collection Time: 02/03/25  7:04 PM   Result Value Ref Range    POCT Glucose 105 70 - 110 mg/dL   POCT glucose    Collection Time: 02/03/25  9:50 PM   Result Value Ref Range    POCT Glucose 147 (H) 70 - 110 mg/dL   POCT glucose    Collection Time: 02/04/25  5:32 AM   Result Value Ref Range    POCT Glucose 242 (H) 70 - 110 mg/dL   Basic metabolic panel    Collection Time: 02/04/25  6:34 AM   Result Value Ref Range    Sodium 141 136 - 145 mmol/L    Potassium 3.8 3.5 - 5.1 mmol/L    Chloride 110 95 - 110 mmol/L    CO2 22 (L) 23 - 29 mmol/L    Glucose 242 (H) 70 - 110 mg/dL    BUN 11 8 - 23 mg/dL    Creatinine 0.8 0.5 - 1.4 mg/dL    Calcium 7.9 (L) 8.7 - 10.5 mg/dL    Anion Gap 9 8 - 16 mmol/L    eGFR >60 >60 mL/min/1.73 m^2         Imaging Results:  Imaging Results              CT Abdomen Pelvis With IV Contrast NO Oral Contrast (Final result)  Result time 02/03/25 18:28:33      Final  result by Mj Quezada MD (02/03/25 18:28:33)                   Impression:      No acute process.  Heterogeneous bone density of the axial skeleton may relate to underlying bone marrow process.  Consider correlation to nuclear medicine bone scan on a nonemergent basis.  Remaining findings as above.    All CT scans at this facility use dose modulation, iterative reconstruction, and/or weight based dosing when appropriate to reduce radiation dose to as low as reasonably achievable.      Electronically signed by: Mj Quezada  Date:    02/03/2025  Time:    18:28               Narrative:    EXAMINATION:  CT ABDOMEN PELVIS WITH IV CONTRAST    CLINICAL HISTORY:  Nausea/vomiting;    TECHNIQUE:  Low dose axial images, sagittal and coronal reformations were obtained from the lung bases to the pubic symphysis following the IV administration of 100 mL of Omnipaque 350.    COMPARISON:  None    FINDINGS:  Heart: Normal size as far as seen. No effusion as far as seen.    Lung Bases: Clear.    Liver: Fatty infiltration liver.  No focal lesions.    Gallbladder: Status post cholecystectomy.    Bile Ducts: No dilatation.    Pancreas: No mass. No peripancreatic fat stranding.    Spleen: Normal.    Adrenals: Normal.    Kidneys/Ureters: Normal enhancement.  No mass or  hydroureteronephrosis.    Bladder: No wall thickening.    Reproductive organs: Normal.    GI Tract/Mesentery: No evidence of bowel obstruction or inflammation.  No evidence of acute appendicitis.  Mild GE junction thickening.  Degenerative joint disease.    Peritoneal Space: No ascites or free air.    Retroperitoneum: No significant adenopathy.    Abdominal wall: Normal.    Vasculature: No aneurysm.  Mild atherosclerotic changes    Bones: No acute fracture.  Heterogeneous bone marrow such that underlying bone marrow process not excluded.  Recommend correlation to nuclear medicine exam.                                       CT Head Without Contrast (Final result)   Result time 02/03/25 13:29:55      Final result by Jmaes Noel MD (02/03/25 13:29:55)                   Impression:      Chronic microvascular ischemic changes.    All CT scans at this facility use dose modulation, iterative reconstruction, and/or weight based dosing when appropriate to reduce radiation dose to as low as reasonable achievable.      Electronically signed by: James Noel MD  Date:    02/03/2025  Time:    13:29               Narrative:    EXAMINATION:  CT HEAD WITHOUT CONTRAST    CLINICAL HISTORY:  Dizziness, persistent/recurrent, cardiac or vascular cause suspected;    TECHNIQUE:  Low dose axial CT images obtained throughout the head without intravenous contrast. Sagittal and coronal reconstructions were performed.    COMPARISON:  03/02/2020    FINDINGS:  Intracranial compartment:    The brain parenchyma demonstrates areas of decreased attenuation with mild to moderate periventricular white matter consistent with chronic microvascular ischemic changes..  No parenchymal mass, hemorrhage, edema or major vascular distribution infarct.  Vascular calcifications are noted.    Mild prominence of the sulci and ventricles are consistent with age-related involutional changes.    No extra-axial blood or fluid collections.    Skull/extracranial contents (limited evaluation): No fracture. Mastoid air cells and paranasal sinuses are essentially clear.                                       X-Ray Chest AP Portable (Final result)  Result time 02/03/25 12:40:48      Final result by James Noel MD (02/03/25 12:40:48)                   Impression:      No acute process seen.      Electronically signed by: James Noel MD  Date:    02/03/2025  Time:    12:40               Narrative:    EXAMINATION:  XR CHEST AP PORTABLE    CLINICAL HISTORY:  hyperglycemia;    FINDINGS:  Single view of the chest.  Comparison 03/13/2023    Cardiac silhouette is normal.  The lungs demonstrate no evidence of active disease.  No  evidence of pleural effusion or pneumothorax.  Bones appear intact.  Moderate degenerative changes and moderate atherosclerotic disease.                                       The EKG was ordered, reviewed, and independently interpreted by the ED provider.  Interpretation time: 11:56  Rate: 115 BPM  Rhythm: sinus tachycardia  Interpretation: Rightward axis. Incomplete RBBB. No STEMI.           The Emergency Provider reviewed the vital signs and test results, which are outlined above.     ED Discussion     4:04 PM: Critical Care team consulted.     4:14 PM: hero Christianson MD (ICU) is requesting a CT of abd since the pt does not appear to have DKA. Belives something else sravani be cuasing acidosis. Does not recommend insulin drip at this time.    6:46 PM: Nola Banks NP (Neuro Critical Care) believes the pt can be admitted to  with no insulin gtt or DKA.    7:30 PM: Spoke with JUNAID Shell reports the day team recommends admission to hospital medicine floor from a critcal care standpoint.     7:38 PM: Discussed case with Joss Hassan NP  ([facility]). Dr. Hassan agrees with current care and management of pt and accepts admission.   Admitting Service:   Admitting Physician: Dr. Hassan  Admit to: OBS     7:38 PM: Re-evaluated pt. I have discussed test results, shared treatment plan, and the need for admission with patient and family at bedside. Pt and family express understanding at this time and agree with all information. All questions answered. Pt and family have no further questions or concerns at this time. Pt is ready for admit.          Medical Decision Making  DDX: 1. DKA 2. Starvation Ketoacidosis 3. Intravascular volume depletion 4. TIA    ECG reviewed sinus tachycardia noted, no acute ischemic changes, CT head negative for acute process, CXR normal, WBC normal, BS over 400 anion gap 23 with bicarb 11, VBG obtained mild acidosis, lipase normal, cardiac markers normal, patient  given 2 liters NS, IV and SC insulin, BS improved to 105, acidosis improved, beta hydroxy negative, no ketones in urine, overall feel patient is not in DKA, feel likely driven by dehydration and decreased po intake, bmp reepated and bicarb 9, ICU consulted, recommended CT abd pelvis, which was negative, recommended hospital med to admit, patient then given bicarb 100 mEq and placed on maint fluids, she is feeling remarkably improved.     Amount and/or Complexity of Data Reviewed  Labs: ordered. Decision-making details documented in ED Course.  Radiology: ordered. Decision-making details documented in ED Course.  ECG/medicine tests: ordered and independent interpretation performed. Decision-making details documented in ED Course.  Discussion of management or test interpretation with external provider(s): Critical Care, Hospital Med    Risk  OTC drugs.  Prescription drug management.  Decision regarding hospitalization.  Diagnosis or treatment significantly limited by social determinants of health.                ED Medication(s):  Medications   sodium chloride 0.9% flush 3 mL (has no administration in time range)   melatonin tablet 6 mg (has no administration in time range)   ondansetron injection 4 mg (has no administration in time range)   promethazine tablet 25 mg (has no administration in time range)   polyethylene glycol packet 17 g (has no administration in time range)   acetaminophen tablet 650 mg (has no administration in time range)   simethicone chewable tablet 80 mg (has no administration in time range)   aluminum-magnesium hydroxide-simethicone 200-200-20 mg/5 mL suspension 30 mL (has no administration in time range)   naloxone 0.4 mg/mL injection 0.02 mg (has no administration in time range)   glucose chewable tablet 16 g (has no administration in time range)   glucose chewable tablet 24 g (has no administration in time range)   dextrose 50% injection 12.5 g (has no administration in time range)    dextrose 50% injection 25 g (has no administration in time range)   glucagon (human recombinant) injection 1 mg (has no administration in time range)   0.9% NaCl infusion ( Intravenous New Bag 2/4/25 0000)   enoxaparin injection 40 mg (40 mg Subcutaneous Given 2/3/25 2241)   insulin aspart U-100 pen 0-5 Units (2 Units Subcutaneous Given 2/4/25 0535)   acetaminophen suppository 650 mg (has no administration in time range)   amLODIPine tablet 5 mg (5 mg Oral Given 2/4/25 0809)   furosemide tablet 20 mg (20 mg Oral Given 2/4/25 0809)   insulin glargine U-100 (Lantus) pen 20 Units (20 Units Subcutaneous Given 2/4/25 0810)   levothyroxine tablet 125 mcg (125 mcg Oral Given 2/4/25 0537)   lisinopriL tablet 5 mg (5 mg Oral Given 2/4/25 0809)   sodium chloride 0.9% bolus 2,000 mL 2,000 mL (0 mLs Intravenous Stopped 2/3/25 1514)   insulin regular injection 8 Units 0.08 mL (8 Units Intravenous Given 2/3/25 1329)   insulin regular injection 8 Units 0.08 mL (8 Units Subcutaneous Given 2/3/25 1329)   sodium bicarbonate solution 100 mEq (100 mEq Intravenous Given 2/3/25 1741)   0.9% NaCl infusion (0 mLs Intravenous Stopped 2/3/25 2241)   iohexoL (OMNIPAQUE 350) injection 100 mL (100 mLs Intravenous Given 2/3/25 1724)   iohexoL (OMNIPAQUE 350) injection 100 mL (100 mLs Intravenous Given 2/3/25 2043)       Current Discharge Medication List           Follow-up Information       Ryan Gu MD. Schedule an appointment as soon as possible for a visit in 3 day(s).    Specialty: Internal Medicine  Contact information:  [address] [facility] [address] [phone]               Ryan Gu MD Follow up in 1 week(s).    Specialty: Internal Medicine  Contact information:  [address] [facility] [address] [phone]                                 Scribe Attestation:   Scribe #1: I performed the above scribed service and the documentation accurately describes the services I performed. I attest to the  accuracy of the note.     Attending:   Physician Attestation Statement for Scribe #1: I, Jaleesa Marc MD, personally performed the services described in this documentation, as scribed by Jaime Bishop, in my presence, and it is both accurate and complete.           Clinical Impression       ICD-10-CM ICD-9-CM   1. Metabolic acidosis, NAG, bicarbonate losses  E87.20 276.2   2. Hyperglycemia  R73.9 790.29   3. Nausea & vomiting  R11.2 787.01   4. Chest pain  R07.9 786.50   5. Hyperglycemia due to diabetes mellitus  E11.65 250.02   6. Intravascular volume depletion  E86.1 276.52   7. Type 2 diabetes mellitus with hyperglycemia, with long-term current use of insulin  E11.65 250.00    Z79.4 790.29     V58.67   8. Starvation ketoacidosis  T73.0XXA 276.2    E87.29    9. Noncompliance with diet and medication regimen  Z91.199 V15.81    Z91.148        Disposition:   Disposition: Placed in Observation  Condition: Stable        Jaleesa Marc MD  02/04/25 1220

## 2025-02-04 VITALS
OXYGEN SATURATION: 99 % | BODY MASS INDEX: 31.49 KG/M2 | HEIGHT: 67 IN | WEIGHT: 200.63 LBS | DIASTOLIC BLOOD PRESSURE: 68 MMHG | SYSTOLIC BLOOD PRESSURE: 135 MMHG | TEMPERATURE: 99 F | RESPIRATION RATE: 16 BRPM | HEART RATE: 86 BPM

## 2025-02-04 PROBLEM — E03.9 HYPOTHYROID: Status: ACTIVE | Noted: 2025-02-04

## 2025-02-04 PROBLEM — E11.65 HYPERGLYCEMIA DUE TO DIABETES MELLITUS: Status: ACTIVE | Noted: 2025-02-04

## 2025-02-04 PROBLEM — I10 HTN (HYPERTENSION): Status: ACTIVE | Noted: 2025-02-04

## 2025-02-04 LAB
ANION GAP SERPL CALC-SCNC: 9 MMOL/L (ref 8–16)
BUN SERPL-MCNC: 11 MG/DL (ref 8–23)
CALCIUM SERPL-MCNC: 7.9 MG/DL (ref 8.7–10.5)
CHLORIDE SERPL-SCNC: 110 MMOL/L (ref 95–110)
CO2 SERPL-SCNC: 22 MMOL/L (ref 23–29)
CREAT SERPL-MCNC: 0.8 MG/DL (ref 0.5–1.4)
EST. GFR  (NO RACE VARIABLE): >60 ML/MIN/1.73 M^2
ESTIMATED AVG GLUCOSE: 200 MG/DL (ref 68–131)
GLUCOSE SERPL-MCNC: 242 MG/DL (ref 70–110)
HBA1C MFR BLD: 8.6 % (ref 4–5.6)
POCT GLUCOSE: 242 MG/DL (ref 70–110)
POCT GLUCOSE: 287 MG/DL (ref 70–110)
POTASSIUM SERPL-SCNC: 3.8 MMOL/L (ref 3.5–5.1)
SODIUM SERPL-SCNC: 141 MMOL/L (ref 136–145)

## 2025-02-04 PROCEDURE — 36415 COLL VENOUS BLD VENIPUNCTURE: CPT | Performed by: NURSE PRACTITIONER

## 2025-02-04 PROCEDURE — 96372 THER/PROPH/DIAG INJ SC/IM: CPT | Performed by: NURSE PRACTITIONER

## 2025-02-04 PROCEDURE — G0378 HOSPITAL OBSERVATION PER HR: HCPCS

## 2025-02-04 PROCEDURE — 25000003 PHARM REV CODE 250: Performed by: NURSE PRACTITIONER

## 2025-02-04 PROCEDURE — 80048 BASIC METABOLIC PNL TOTAL CA: CPT | Performed by: NURSE PRACTITIONER

## 2025-02-04 PROCEDURE — 63600175 PHARM REV CODE 636 W HCPCS: Performed by: NURSE PRACTITIONER

## 2025-02-04 RX ORDER — FUROSEMIDE 20 MG/1
20 TABLET ORAL DAILY
Status: DISCONTINUED | OUTPATIENT
Start: 2025-02-04 | End: 2025-02-04 | Stop reason: HOSPADM

## 2025-02-04 RX ORDER — LISINOPRIL 5 MG/1
5 TABLET ORAL DAILY
Status: DISCONTINUED | OUTPATIENT
Start: 2025-02-04 | End: 2025-02-04 | Stop reason: HOSPADM

## 2025-02-04 RX ORDER — INSULIN GLARGINE 100 [IU]/ML
20 INJECTION, SOLUTION SUBCUTANEOUS 2 TIMES DAILY
Status: DISCONTINUED | OUTPATIENT
Start: 2025-02-04 | End: 2025-02-04 | Stop reason: HOSPADM

## 2025-02-04 RX ORDER — AMLODIPINE BESYLATE 5 MG/1
5 TABLET ORAL DAILY
Status: DISCONTINUED | OUTPATIENT
Start: 2025-02-04 | End: 2025-02-04 | Stop reason: HOSPADM

## 2025-02-04 RX ORDER — LEVOTHYROXINE SODIUM 125 UG/1
125 TABLET ORAL
Status: DISCONTINUED | OUTPATIENT
Start: 2025-02-04 | End: 2025-02-04 | Stop reason: HOSPADM

## 2025-02-04 RX ADMIN — LISINOPRIL 5 MG: 5 TABLET ORAL at 08:02

## 2025-02-04 RX ADMIN — FUROSEMIDE 20 MG: 20 TABLET ORAL at 08:02

## 2025-02-04 RX ADMIN — INSULIN GLARGINE 20 UNITS: 100 INJECTION, SOLUTION SUBCUTANEOUS at 08:02

## 2025-02-04 RX ADMIN — INSULIN ASPART 2 UNITS: 100 INJECTION, SOLUTION INTRAVENOUS; SUBCUTANEOUS at 05:02

## 2025-02-04 RX ADMIN — SODIUM CHLORIDE: 9 INJECTION, SOLUTION INTRAVENOUS at 12:02

## 2025-02-04 RX ADMIN — INSULIN ASPART 3 UNITS: 100 INJECTION, SOLUTION INTRAVENOUS; SUBCUTANEOUS at 12:02

## 2025-02-04 RX ADMIN — LEVOTHYROXINE SODIUM 125 MCG: 0.12 TABLET ORAL at 05:02

## 2025-02-04 RX ADMIN — AMLODIPINE BESYLATE 5 MG: 5 TABLET ORAL at 08:02

## 2025-02-04 NOTE — PLAN OF CARE
A222/A222 JOSE Tejada is a 64 y.o.female admitted on 2/3/2025 for Hyperglycemia due to diabetes mellitus   Code Status: Full Code MRN: [id number]   Review of patient's allergies indicates:  No Known Allergies  Past Medical History:   Diagnosis Date    Diabetes mellitus     Hypertension       PRN meds    acetaminophen, 650 mg, Q6H PRN  acetaminophen, 650 mg, Q6H PRN  aluminum-magnesium hydroxide-simethicone, 30 mL, QID PRN  dextrose 50%, 12.5 g, PRN  dextrose 50%, 25 g, PRN  glucagon (human recombinant), 1 mg, PRN  glucose, 16 g, PRN  glucose, 24 g, PRN  insulin aspart U-100, 0-5 Units, QID (AC + HS) PRN  melatonin, 6 mg, Nightly PRN  naloxone, 0.02 mg, PRN  ondansetron, 4 mg, Q8H PRN  polyethylene glycol, 17 g, Daily PRN  promethazine, 25 mg, Q6H PRN  simethicone, 1 tablet, QID PRN  sodium chloride 0.9%, 3 mL, Q12H PRN      Chart check completed. Will continue plan of care.         [place] Coma Scale Score: 15     Lead Monitored: Lead II Rhythm: normal sinus rhythm    Cardiac/Telemetry Box Number: [id number]  VTE Core Measure: Pharmacological prophylaxis initiated/maintained Last Bowel Movement: 02/03/25  Diet diabetic 2000 Calories (up to 75 gm per meal)  Voiding Characteristics: urgency  Carlos Score: 20  Fall Risk Score: 8  Accucheck [x]   Freq? achs     Lines/Drains/Airways       Peripheral Intravenous Line  Duration                  Peripheral IV - Single Lumen 02/03/25 1228 20 G Anterior;Distal;Left Forearm <1 day

## 2025-02-04 NOTE — PLAN OF CARE
A222/A222 JOSE Tejada is a 64 y.o.female admitted on 2/3/2025 for Hyperglycemia due to diabetes mellitus   Code Status: Full Code MRN: [id number]   Review of patient's allergies indicates:  No Known Allergies  Past Medical History:   Diagnosis Date    Diabetes mellitus     Hypertension       PRN meds    acetaminophen, 650 mg, Q6H PRN  acetaminophen, 650 mg, Q6H PRN  aluminum-magnesium hydroxide-simethicone, 30 mL, QID PRN  dextrose 50%, 12.5 g, PRN  dextrose 50%, 25 g, PRN  glucagon (human recombinant), 1 mg, PRN  glucose, 16 g, PRN  glucose, 24 g, PRN  insulin aspart U-100, 0-5 Units, QID (AC + HS) PRN  melatonin, 6 mg, Nightly PRN  naloxone, 0.02 mg, PRN  ondansetron, 4 mg, Q8H PRN  polyethylene glycol, 17 g, Daily PRN  promethazine, 25 mg, Q6H PRN  simethicone, 1 tablet, QID PRN  sodium chloride 0.9%, 3 mL, Q12H PRN      Chart check completed. Will continue plan of care.         [place] Coma Scale Score: 15     Lead Monitored: Lead II Rhythm: normal sinus rhythm    Cardiac/Telemetry Box Number: [id number]  VTE Core Measure: Pharmacological prophylaxis initiated/maintained Last Bowel Movement: 02/03/25  Diet diabetic 2000 Calories (up to 75 gm per meal)  Voiding Characteristics: urgency  Carlos Score: 20  Fall Risk Score: 8  Accucheck []   Freq?      Lines/Drains/Airways       Peripheral Intravenous Line  Duration                  Peripheral IV - Single Lumen 02/03/25 1228 20 G Anterior;Distal;Left Forearm 1 day

## 2025-02-04 NOTE — ASSESSMENT & PLAN NOTE
Patient's FSGs are uncontrolled due to hyperglycemia on current medication regimen.  Last A1c reviewed-   Lab Results   Component Value Date    HGBA1C 8.6 (H) 02/03/2025     Most recent fingerstick glucose reviewed-   Recent Labs   Lab 02/03/25  1225 02/03/25  1511 02/03/25  1904 02/03/25  2150   POCTGLUCOSE 421* 215* 105 147*     Current correctional scale  Low  Maintain anti-hyperglycemic dose as follows-   Antihyperglycemics (From admission, onward)      Start     Stop Route Frequency Ordered    02/04/25 0900  insulin glargine U-100 (Lantus) pen 20 Units         -- SubQ 2 times daily 02/04/25 0132    02/03/25 2026  insulin aspart U-100 pen 0-5 Units         -- SubQ Before meals & nightly PRN 02/03/25 1927          Plan:  -SSI  -A1c  -Accu-checks  -Hold oral hypoglycemics while patient is in the hospital  -Continue home long-acting insulin at 25-50% decrease, titrate up as needed  -Hypoglycemic protocol

## 2025-02-04 NOTE — CONSULTS
Consult received. The medical records were reviewed.     Current admission diagnosis:   Hyperglycemia due to diabetes mellitus             Met with pt and family at bedside for diabetes management practices. Family participated in education. Reviewed current A1C and target. Reviewed disease process and medical complications associated with uncontrolled diabetes. Encouraged lifestyle management to include weight loss, increased activity, and appropriate diet as a part of diabetes management.    Patient states she has medications needed edwin home and takes as ordered. However family member states patient has not been taking the mounjaro as ordered and patient states she doesn't because she doesn't like that it decreases her appetite. Encourage patient to discuss with provider about decreasing dose.      Pt reports having  meter and supplies at home. Pt checks blood sugars 1-2 times per day. Provided log with recommended testing times and expected results. Discussed S/S of hypoglycemia. Reviewed appropriate treatment options. Reviewed S/S of hyperglycemia. Discussed when to test for ketones: BS>250, as well as S/S of DKA. Reviewed ketone results and when to seek medical help.     Discussed carbohydrate containing foods, 30-60 carbohydrate choice meal plan using pictures; serving sizes, Plate Method, label reading,  and Ropatec Ze [place]. Recommended lean protein and healthy fat with meals and snacks. Encouraged to avoid obvious sources of sugar. Patient states she doesn't eat a diabetic diet at this time. Patient states she will eat bananas, root beer, orange juice, and a salad for the day. Patient states she is aware that she does have to make heatlhier food options.      Reviewed the diabetes care checklist and the importance of regular follow up with physician. Left diabetes education packet for reference and contact information. Encouraged pt to contact diabetes education team with any questions or concerns.      Self Management Goals:   Patient states she is wanting to make healthier food choices.   Patient also wanting to get a CGM. Referral placed for diabetes management and education outpatient to discuss     Recommendation:  Referral for outpatient diabetes education and Diabetes management NP ordered.

## 2025-02-04 NOTE — PLAN OF CARE
[facility] - Telemetry (Hospital)  Discharge Final Note    Primary Care Provider: Ryan Gu MD    Expected Discharge Date: 2/4/2025    Final Discharge Note (most recent)       Final Note - 02/04/25 1100          Final Note    Assessment Type Final Discharge Note     Anticipated Discharge Disposition Home or Self Care     Hospital Resources/Appts/Education Provided Provided patient/caregiver with written discharge plan information        Post-Acute Status    Discharge Delays None known at this time                     Important Message from Medicare             Contact Info       Ryan Gu MD   Specialty: Internal Medicine   Relationship: PCP - General    [facility]  [address] [facility] [address]   Phone: [phone]       Next Steps: Schedule an appointment as soon as possible for a visit in 3 day(s)          DC Dispo: home with family    PCP: [facility] Instructions to follow up with PCP added to AVS    Patient discharging home. CM reviewed chart; no other needs or consults noted.

## 2025-02-04 NOTE — H&P
"  [facility]  History & Physical    Patient Name: Birgit Tejada  MRN: [id number]  Patient Class: OP- Observation  Admission Date: 2/3/2025  Attending Physician: Rubin Hassan MD   Primary Care Provider: No primary care provider on file.         Patient information was obtained from patient, past medical records, and ER records.     Subjective:     Principal Problem:Hyperglycemia due to diabetes mellitus    Chief Complaint:   Chief Complaint   Patient presents with    Hyperglycemia     Pt. Reports her CBG was 568 this morning and she was having weakness.         HPI: Birgit Tejada is a 64 y.o. female with a PMH  has a past medical history of Diabetes mellitus and Hypertension.presented to the Emergency Department for evaluation of elevated blood glucose which onset gradually throughout the past day. Pt's blood glucose was over 500 this morning; pt reports glucose has been running high since yesterday. Pt's last insulin intake was yesterday night. Reports being unable to take insulin this morning. Pt denies eating any food, however, she did drink root beer and coffee prior to going to work at [employer]. Pt reports that her blood glucose normally runs in the 200s. Symptoms are constant and moderate in severity. No mitigating or exacerbating factors reported. Associated sxs include dizziness and described it as feeling "drunk." Pt denies drinking any EtOH this morning. Pt reports she did drink wine and only ate crawfish yesterday. Patient denies any dysuria, abd pain, fever, CP, SOB, N/V, and all other sxs at this time. No further complaints or concerns at this time.     ER workup revealed CBC to be unremarkable.  CMP revealed CBG of 444 mg/dL with a anion gap of 23.  Repeat CBG of 213 mg/dL with anion gap of 18 after receiving treatment.  A1c 8.6.  Beta hydroxybutyrate 0.1.  UA and drug screen unremarkable.  VBG revealed pH of 7.301, pCO2 of 26.8, PO2 of 58, HC03 of 13.2.  " Chest x-ray negative for acute findings.  EKG revealed sinus tach with incomplete left bundle-branch block with a ventricular rate of 115 beats per minute and a QT/QTC of 386/533.  CT head negative for acute findings.  Patient received 3 L of normal saline, 8 units regular insulin IV, 8 units regular insulin subQ, and 100 mEq of sodium bicarbonate IVPB while in ER.  ICU consulted.  Patient is suitable for floor due to labs improving.  Hospital Medicine consulted to admit patient for hyperglycemia.  Patient in agreement with treatment plan.  Patient admitted under observation status.    PCP: No primary care provider on file.      Past Medical History:   Diagnosis Date    Diabetes mellitus     Hypertension        History reviewed. No pertinent surgical history.    Review of patient's allergies indicates:  No Known Allergies    No current facility-administered medications on file prior to encounter.     Current Outpatient Medications on File Prior to Encounter   Medication Sig    amLODIPine (NORVASC) 5 MG tablet Take 1 tablet by mouth once daily.    furosemide (LASIX) 20 MG tablet Take 1 tablet by mouth once daily.    insulin detemir U-100 (LEVEMIR) 100 unit/mL injection Inject 25 Units into the skin 2 (two) times a day.    levothyroxine (SYNTHROID) 125 MCG tablet Take 1 tablet by mouth every morning.    lisinopriL (PRINIVIL,ZESTRIL) 5 MG tablet Take 1 tablet by mouth once daily.    metFORMIN (GLUCOPHAGE-XR) 500 MG ER 24hr tablet Take 1,000 mg by mouth 2 (two) times daily with meals.    tirzepatide (MOUNJARO) 2.5 mg/0.5 mL PnIj Inject 2.5 mg into the skin every 7 days.     Family History    None       Tobacco Use    Smoking status: Never    Smokeless tobacco: Never   Substance and Sexual Activity    Alcohol use: Yes     Comment: Occasional    Drug use: Never    Sexual activity: Not on file     Review of Systems   Respiratory:  Negative for cough and shortness of breath.    Cardiovascular:  Negative for chest pain,  palpitations and leg swelling.   Gastrointestinal:  Negative for abdominal pain, diarrhea, nausea and vomiting.   Endocrine: Positive for polyuria.   Genitourinary:  Positive for frequency. Negative for dysuria and flank pain.   Neurological:  Positive for dizziness and light-headedness.   All other systems reviewed and are negative.    Objective:     Vital Signs (Most Recent):  Temp: 98.2 °F (36.8 °C) (02/04/25 0023)  Pulse: 88 (02/04/25 0023)  Resp: 18 (02/04/25 0023)  BP: 136/71 (02/04/25 0023)  SpO2: 95 % (02/04/25 0023) Vital Signs (24h Range):  Temp:  [98.2 °F (36.8 °C)-98.5 °F (36.9 °C)] 98.2 °F (36.8 °C)  Pulse:  [] 88  Resp:  [16-23] 18  SpO2:  [95 %-99 %] 95 %  BP: (135-167)/(66-94) 136/71     Weight: 91 kg (200 lb 9.6 oz)  Body mass index is 31.42 kg/m².     Physical Exam  Vitals and nursing note reviewed.   Constitutional:       General: She is awake. She is not in acute distress.     Appearance: Normal appearance. She is well-developed and well-groomed. She is not ill-appearing, toxic-appearing or diaphoretic.   HENT:      Head: Normocephalic and atraumatic.      Mouth/Throat:      Lips: Pink.      Mouth: Mucous membranes are moist.      Pharynx: Oropharynx is clear. Uvula midline.   Eyes:      Extraocular Movements: Extraocular movements intact.      Conjunctiva/sclera: Conjunctivae normal.      Pupils: Pupils are equal, round, and reactive to light.   Cardiovascular:      Rate and Rhythm: Normal rate and regular rhythm.      Heart sounds: Normal heart sounds. No murmur heard.  Pulmonary:      Effort: Pulmonary effort is normal.      Breath sounds: Normal breath sounds. No decreased breath sounds, wheezing, rhonchi or rales.   Abdominal:      General: Bowel sounds are normal.      Palpations: Abdomen is soft.      Tenderness: There is no abdominal tenderness.   Musculoskeletal:      Cervical back: Normal range of motion and neck supple.      Right lower leg: No edema.      Left lower leg: No  edema.      Comments: 5/5 strength throughout   Skin:     General: Skin is warm and dry.      Capillary Refill: Capillary refill takes less than 2 seconds.   Neurological:      General: No focal deficit present.      Mental Status: She is alert and oriented to person, place, and time. Mental status is at baseline.      GCS: GCS eye subscore is 4. GCS verbal subscore is 5. GCS motor subscore is 6.      Cranial Nerves: Cranial nerves 2-12 are intact.      Sensory: Sensation is intact.      Motor: Motor function is intact.   Psychiatric:         Mood and Affect: Mood normal.         Speech: Speech normal.         Behavior: Behavior normal. Behavior is cooperative.              CRANIAL NERVES     CN III, IV, VI   Pupils are equal, round, and reactive to light.     LABS:  Recent Results (from the past 24 hours)   POCT glucose    Collection Time: 02/03/25 11:15 AM   Result Value Ref Range    POCT Glucose 475 (HH) 70 - 110 mg/dL   EKG 12-lead    Collection Time: 02/03/25 11:56 AM   Result Value Ref Range    QRS Duration 120 ms    OHS QTC Calculation 533 ms   POCT glucose    Collection Time: 02/03/25 12:25 PM   Result Value Ref Range    POCT Glucose 421 (H) 70 - 110 mg/dL   CBC auto differential    Collection Time: 02/03/25 12:28 PM   Result Value Ref Range    WBC 8.36 3.90 - 12.70 K/uL    RBC 4.31 4.00 - 5.40 M/uL    Hemoglobin 12.8 12.0 - 16.0 g/dL    Hematocrit 39.6 37.0 - 48.5 %    MCV 92 82 - 98 fL    MCH 29.7 27.0 - 31.0 pg    MCHC 32.3 32.0 - 36.0 g/dL    RDW 13.2 11.5 - 14.5 %    Platelets 325 150 - 450 K/uL    MPV 10.0 9.2 - 12.9 fL    Immature Granulocytes 0.6 (H) 0.0 - 0.5 %    Gran # (ANC) 6.4 1.8 - 7.7 K/uL    Immature Grans (Abs) 0.05 (H) 0.00 - 0.04 K/uL    Lymph # 1.5 1.0 - 4.8 K/uL    Mono # 0.4 0.3 - 1.0 K/uL    Eos # 0.0 0.0 - 0.5 K/uL    Baso # 0.04 0.00 - 0.20 K/uL    nRBC 0 0 /100 WBC    Gran % 76.5 (H) 38.0 - 73.0 %    Lymph % 17.6 (L) 18.0 - 48.0 %    Mono % 4.7 4.0 - 15.0 %    Eosinophil % 0.1 0.0 -  8.0 %    Basophil % 0.5 0.0 - 1.9 %    Differential Method Automated    Comprehensive metabolic panel    Collection Time: 02/03/25 12:28 PM   Result Value Ref Range    Sodium 136 136 - 145 mmol/L    Potassium 4.5 3.5 - 5.1 mmol/L    Chloride 102 95 - 110 mmol/L    CO2 11 (L) 23 - 29 mmol/L    Glucose 444 (H) 70 - 110 mg/dL    BUN 13 8 - 23 mg/dL    Creatinine 1.1 0.5 - 1.4 mg/dL    Calcium 8.9 8.7 - 10.5 mg/dL    Total Protein 8.0 6.0 - 8.4 g/dL    Albumin 4.2 3.5 - 5.2 g/dL    Total Bilirubin 0.2 0.1 - 1.0 mg/dL    Alkaline Phosphatase 67 40 - 150 U/L    AST 11 10 - 40 U/L    ALT 16 10 - 44 U/L    eGFR 56 (A) >60 mL/min/1.73 m^2    Anion Gap 23 (H) 8 - 16 mmol/L   Beta - Hydroxybutyrate, Serum    Collection Time: 02/03/25 12:28 PM   Result Value Ref Range    Beta-Hydroxybutyrate 0.1 0.0 - 0.5 mmol/L   Magnesium    Collection Time: 02/03/25 12:28 PM   Result Value Ref Range    Magnesium 1.8 1.6 - 2.6 mg/dL   Hemoglobin A1c    Collection Time: 02/03/25 12:28 PM   Result Value Ref Range    Hemoglobin A1C 8.6 (H) 4.0 - 5.6 %    Estimated Avg Glucose 200 (H) 68 - 131 mg/dL   TSH    Collection Time: 02/03/25 12:28 PM   Result Value Ref Range    TSH <0.010 (L) 0.400 - 4.000 uIU/mL   T4, Free    Collection Time: 02/03/25 12:28 PM   Result Value Ref Range    Free T4 1.43 0.71 - 1.51 ng/dL   Lipase    Collection Time: 02/03/25 12:28 PM   Result Value Ref Range    Lipase 21 4 - 60 U/L   Ethanol    Collection Time: 02/03/25 12:28 PM   Result Value Ref Range    Alcohol, Serum <10 <10 mg/dL   ISTAT PROCEDURE    Collection Time: 02/03/25 12:32 PM   Result Value Ref Range    POC PH 7.301 (L) 7.35 - 7.45    POC PCO2 26.8 (L) 35 - 45 mmHg    POC PO2 58 40 - 60 mmHg    POC HCO3 13.2 (L) 24 - 28 mmol/L    POC BE -13 (L) -2 to 2 mmol/L    POC SATURATED O2 87 95 - 100 %    POC Glucose 437 (H) 70 - 110 mg/dL    POC Sodium 136 136 - 145 mmol/L    POC Potassium 4.5 3.5 - 5.1 mmol/L    POC Ionized Calcium 1.14 1.06 - 1.42 mmol/L    POC  Hematocrit 39 36 - 54 %PCV    Sample VENOUS     Site Other     Allens Test N/A     DelSys Room Air     Mode SPONT     FiO2 21    Basic metabolic panel    Collection Time: 02/03/25  3:02 PM   Result Value Ref Range    Sodium 139 136 - 145 mmol/L    Potassium 5.0 3.5 - 5.1 mmol/L    Chloride 112 (H) 95 - 110 mmol/L    CO2 9 (LL) 23 - 29 mmol/L    Glucose 213 (H) 70 - 110 mg/dL    BUN 10 8 - 23 mg/dL    Creatinine 0.8 0.5 - 1.4 mg/dL    Calcium 8.3 (L) 8.7 - 10.5 mg/dL    Anion Gap 18 (H) 8 - 16 mmol/L    eGFR >60 >60 mL/min/1.73 m^2   Urinalysis, Reflex to Urine Culture Urine, Clean Catch    Collection Time: 02/03/25  3:07 PM    Specimen: Urine   Result Value Ref Range    Specimen UA Urine, Clean Catch     Color, UA Colorless (A) Yellow, Straw, Michelle    Appearance, UA Clear Clear    pH, UA 5.0 5.0 - 8.0    Specific Gravity, UA 1.015 1.005 - 1.030    Protein, UA Negative Negative    Glucose, UA 4+ (A) Negative    Ketones, UA Negative Negative    Bilirubin (UA) Negative Negative    Occult Blood UA Negative Negative    Nitrite, UA Negative Negative    Urobilinogen, UA Negative <2.0 EU/dL    Leukocytes, UA Negative Negative   Urinalysis Microscopic    Collection Time: 02/03/25  3:07 PM   Result Value Ref Range    WBC, UA 1 0 - 5 /hpf    Bacteria Rare None-Occ /hpf    Yeast, UA None None    Ca Oxalate Mai, UA Many (A) None-Moderate    Microscopic Comment SEE COMMENT    Drug screen panel, emergency    Collection Time: 02/03/25  3:08 PM   Result Value Ref Range    Benzodiazepines Negative Negative    Methadone metabolites Negative Negative    Cocaine (Metab.) Negative Negative    Opiate Scrn, Ur Negative Negative    Barbiturate Screen, Ur Negative Negative    Amphetamine Screen, Ur Negative Negative    THC Negative Negative    Phencyclidine Negative Negative    Creatinine, Urine 18.2 15.0 - 325.0 mg/dL    Toxicology Information SEE COMMENT    POCT glucose    Collection Time: 02/03/25  3:11 PM   Result Value Ref Range     POCT Glucose 215 (H) 70 - 110 mg/dL   Lactic acid, plasma    Collection Time: 02/03/25  4:34 PM   Result Value Ref Range    Lactate (Lactic Acid) 2.7 (H) 0.5 - 2.2 mmol/L   POCT glucose    Collection Time: 02/03/25  7:04 PM   Result Value Ref Range    POCT Glucose 105 70 - 110 mg/dL   POCT glucose    Collection Time: 02/03/25  9:50 PM   Result Value Ref Range    POCT Glucose 147 (H) 70 - 110 mg/dL       RADIOLOGY  CT Abdomen Pelvis With IV Contrast NO Oral Contrast    Result Date: 2/3/2025  EXAMINATION: CT ABDOMEN PELVIS WITH IV CONTRAST CLINICAL HISTORY: Nausea/vomiting; TECHNIQUE: Low dose axial images, sagittal and coronal reformations were obtained from the lung bases to the pubic symphysis following the IV administration of 100 mL of Omnipaque 350. COMPARISON: None FINDINGS: Heart: Normal size as far as seen. No effusion as far as seen. Lung Bases: Clear. Liver: Fatty infiltration liver.  No focal lesions. Gallbladder: Status post cholecystectomy. Bile Ducts: No dilatation. Pancreas: No mass. No peripancreatic fat stranding. Spleen: Normal. Adrenals: Normal. Kidneys/Ureters: Normal enhancement.  No mass or  hydroureteronephrosis. Bladder: No wall thickening. Reproductive organs: Normal. GI Tract/Mesentery: No evidence of bowel obstruction or inflammation.  No evidence of acute appendicitis.  Mild GE junction thickening.  Degenerative joint disease. Peritoneal Space: No ascites or free air. Retroperitoneum: No significant adenopathy. Abdominal wall: Normal. Vasculature: No aneurysm.  Mild atherosclerotic changes Bones: No acute fracture.  Heterogeneous bone marrow such that underlying bone marrow process not excluded.  Recommend correlation to nuclear medicine exam.     No acute process.  Heterogeneous bone density of the axial skeleton may relate to underlying bone marrow process.  Consider correlation to nuclear medicine bone scan on a nonemergent basis.  Remaining findings as above. All CT scans at this  facility use dose modulation, iterative reconstruction, and/or weight based dosing when appropriate to reduce radiation dose to as low as reasonably achievable. Electronically signed by: Mj Quezada Date:    02/03/2025 Time:    18:28    CT Head Without Contrast    Result Date: 2/3/2025  EXAMINATION: CT HEAD WITHOUT CONTRAST CLINICAL HISTORY: Dizziness, persistent/recurrent, cardiac or vascular cause suspected; TECHNIQUE: Low dose axial CT images obtained throughout the head without intravenous contrast. Sagittal and coronal reconstructions were performed. COMPARISON: 03/02/2020 FINDINGS: Intracranial compartment: The brain parenchyma demonstrates areas of decreased attenuation with mild to moderate periventricular white matter consistent with chronic microvascular ischemic changes..  No parenchymal mass, hemorrhage, edema or major vascular distribution infarct.  Vascular calcifications are noted. Mild prominence of the sulci and ventricles are consistent with age-related involutional changes. No extra-axial blood or fluid collections. Skull/extracranial contents (limited evaluation): No fracture. Mastoid air cells and paranasal sinuses are essentially clear.     Chronic microvascular ischemic changes. All CT scans at this facility use dose modulation, iterative reconstruction, and/or weight based dosing when appropriate to reduce radiation dose to as low as reasonable achievable. Electronically signed by: James Noel MD Date:    02/03/2025 Time:    13:29    X-Ray Chest AP Portable    Result Date: 2/3/2025  EXAMINATION: XR CHEST AP PORTABLE CLINICAL HISTORY: hyperglycemia; FINDINGS: Single view of the chest.  Comparison 03/13/2023 Cardiac silhouette is normal.  The lungs demonstrate no evidence of active disease.  No evidence of pleural effusion or pneumothorax.  Bones appear intact.  Moderate degenerative changes and moderate atherosclerotic disease.     No acute process seen. Electronically signed by: James  MD Sadie Date:    02/03/2025 Time:    12:40      EKG    MICROBIOLOGY    MDM     Amount and/or Complexity of Data Reviewed  Clinical lab tests: reviewed  Tests in the radiology section of CPT®: reviewed  Tests in the medicine section of CPT®: reviewed  Discussion of test results with the performing providers: yes  Decide to obtain previous medical records or to obtain history from someone other than the patient: yes  Obtain history from someone other than the patient: yes  Review and summarize past medical records: yes  Discuss the patient with other providers: yes  Independent visualization of images, tracings, or specimens: yes        Assessment/Plan:     * Hyperglycemia due to diabetes mellitus  Patient's FSGs are uncontrolled due to hyperglycemia on current medication regimen.  Last A1c reviewed-   Lab Results   Component Value Date    HGBA1C 8.6 (H) 02/03/2025     Most recent fingerstick glucose reviewed-   Recent Labs   Lab 02/03/25  1225 02/03/25  1511 02/03/25  1904 02/03/25  2150   POCTGLUCOSE 421* 215* 105 147*     Current correctional scale  Low  Maintain anti-hyperglycemic dose as follows-   Antihyperglycemics (From admission, onward)      Start     Stop Route Frequency Ordered    02/04/25 0900  insulin glargine U-100 (Lantus) pen 20 Units         -- SubQ 2 times daily 02/04/25 0132    02/03/25 2026  insulin aspart U-100 pen 0-5 Units         -- SubQ Before meals & nightly PRN 02/03/25 1927          Plan:  -SSI  -A1c  -Accu-checks  -Hold oral hypoglycemics while patient is in the hospital  -Continue home long-acting insulin at 25-50% decrease, titrate up as needed  -Hypoglycemic protocol          HTN (hypertension)  Patient's blood pressure range in the last 24 hours was: BP  Min: 135/66  Max: 167/86.The patient's inpatient anti-hypertensive regimen is listed below:  Current Antihypertensives  , Daily, Oral  , Daily, Oral  , Daily, Oral  amLODIPine tablet 5 mg, Daily, Oral  furosemide tablet 20 mg,  Daily, Oral  lisinopriL tablet 5 mg, Daily, Oral    Plan  - BP is controlled, no changes needed to their regimen      Hypothyroid  Patient has chronic hypothyroidism. TFTs reviewed-   Lab Results   Component Value Date    TSH <0.010 (L) 02/03/2025   . Will continue chronic levothyroxine and adjust for and clinical changes.          VTE Risk Mitigation (From admission, onward)           Ordered     enoxaparin injection 40 mg  Daily         02/03/25 1927     IP VTE LOW RISK PATIENT  Once         02/03/25 1927     Place sequential compression device  Until discontinued         02/03/25 1927                  //Core Measures   -DVT proph: SCDs, lovenox  -Code status Full    -Surrogate:none present    Components of this note were documented using a voice recognition system and are subject to errors not corrected at the time the document was proof read. Please contact the author for any clarifications.     Joss Hassan NP  Department of Hospital Medicine  '[place] [facility] Tele[facility] ([facility])

## 2025-02-04 NOTE — ASSESSMENT & PLAN NOTE
Patient's blood pressure range in the last 24 hours was: BP  Min: 135/66  Max: 167/86.The patient's inpatient anti-hypertensive regimen is listed below:  Current Antihypertensives  , Daily, Oral  , Daily, Oral  , Daily, Oral  amLODIPine tablet 5 mg, Daily, Oral  furosemide tablet 20 mg, Daily, Oral  lisinopriL tablet 5 mg, Daily, Oral    Plan  - BP is controlled, no changes needed to their regimen

## 2025-02-04 NOTE — HPI
"Birgit Tejada is a 64 y.o. female with a PMH  has a past medical history of Diabetes mellitus and Hypertension.presented to the Emergency Department for evaluation of elevated blood glucose which onset gradually throughout the past day. Pt's blood glucose was over 500 this morning; pt reports glucose has been running high since yesterday. Pt's last insulin intake was yesterday night. Reports being unable to take insulin this morning. Pt denies eating any food, however, she did drink root beer and coffee prior to going to work at [employer]. Pt reports that her blood glucose normally runs in the 200s. Symptoms are constant and moderate in severity. No mitigating or exacerbating factors reported. Associated sxs include dizziness and described it as feeling "drunk." Pt denies drinking any EtOH this morning. Pt reports she did drink wine and only ate crawfish yesterday. Patient denies any dysuria, abd pain, fever, CP, SOB, N/V, and all other sxs at this time. No further complaints or concerns at this time.     ER workup revealed CBC to be unremarkable.  CMP revealed CBG of 444 mg/dL with a anion gap of 23.  Repeat CBG of 213 mg/dL with anion gap of 18 after receiving treatment.  A1c 8.6.  Beta hydroxybutyrate 0.1.  UA and drug screen unremarkable.  VBG revealed pH of 7.301, pCO2 of 26.8, PO2 of 58, HC03 of 13.2.  Chest x-ray negative for acute findings.  EKG revealed sinus tach with incomplete left bundle-branch block with a ventricular rate of 115 beats per minute and a QT/QTC of 386/533.  CT head negative for acute findings.  Patient received 3 L of normal saline, 8 units regular insulin IV, 8 units regular insulin subQ, and 100 mEq of sodium bicarbonate IVPB while in ER.  ICU consulted.  Patient is suitable for floor due to labs improving.  Hospital Medicine consulted to admit patient for hyperglycemia.  Patient in agreement with treatment plan.  Patient admitted under observation status.    PCP: No primary care " provider on file.

## 2025-02-04 NOTE — SUBJECTIVE & OBJECTIVE
Past Medical History:   Diagnosis Date    Diabetes mellitus     Hypertension        History reviewed. No pertinent surgical history.    Review of patient's allergies indicates:  No Known Allergies    No current facility-administered medications on file prior to encounter.     Current Outpatient Medications on File Prior to Encounter   Medication Sig    amLODIPine (NORVASC) 5 MG tablet Take 1 tablet by mouth once daily.    furosemide (LASIX) 20 MG tablet Take 1 tablet by mouth once daily.    insulin detemir U-100 (LEVEMIR) 100 unit/mL injection Inject 25 Units into the skin 2 (two) times a day.    levothyroxine (SYNTHROID) 125 MCG tablet Take 1 tablet by mouth every morning.    lisinopriL (PRINIVIL,ZESTRIL) 5 MG tablet Take 1 tablet by mouth once daily.    metFORMIN (GLUCOPHAGE-XR) 500 MG ER 24hr tablet Take 1,000 mg by mouth 2 (two) times daily with meals.    tirzepatide (MOUNJARO) 2.5 mg/0.5 mL PnIj Inject 2.5 mg into the skin every 7 days.     Family History    None       Tobacco Use    Smoking status: Never    Smokeless tobacco: Never   Substance and Sexual Activity    Alcohol use: Yes     Comment: Occasional    Drug use: Never    Sexual activity: Not on file     Review of Systems   Respiratory:  Negative for cough and shortness of breath.    Cardiovascular:  Negative for chest pain, palpitations and leg swelling.   Gastrointestinal:  Negative for abdominal pain, diarrhea, nausea and vomiting.   Endocrine: Positive for polyuria.   Genitourinary:  Positive for frequency. Negative for dysuria and flank pain.   Neurological:  Positive for dizziness and light-headedness.   All other systems reviewed and are negative.    Objective:     Vital Signs (Most Recent):  Temp: 98.2 °F (36.8 °C) (02/04/25 0023)  Pulse: 88 (02/04/25 0023)  Resp: 18 (02/04/25 0023)  BP: 136/71 (02/04/25 0023)  SpO2: 95 % (02/04/25 0023) Vital Signs (24h Range):  Temp:  [98.2 °F (36.8 °C)-98.5 °F (36.9 °C)] 98.2 °F (36.8 °C)  Pulse:  []  88  Resp:  [16-23] 18  SpO2:  [95 %-99 %] 95 %  BP: (135-167)/(66-94) 136/71     Weight: 91 kg (200 lb 9.6 oz)  Body mass index is 31.42 kg/m².     Physical Exam  Vitals and nursing note reviewed.   Constitutional:       General: She is awake. She is not in acute distress.     Appearance: Normal appearance. She is well-developed and well-groomed. She is not ill-appearing, toxic-appearing or diaphoretic.   HENT:      Head: Normocephalic and atraumatic.      Mouth/Throat:      Lips: Pink.      Mouth: Mucous membranes are moist.      Pharynx: Oropharynx is clear. Uvula midline.   Eyes:      Extraocular Movements: Extraocular movements intact.      Conjunctiva/sclera: Conjunctivae normal.      Pupils: Pupils are equal, round, and reactive to light.   Cardiovascular:      Rate and Rhythm: Normal rate and regular rhythm.      Heart sounds: Normal heart sounds. No murmur heard.  Pulmonary:      Effort: Pulmonary effort is normal.      Breath sounds: Normal breath sounds. No decreased breath sounds, wheezing, rhonchi or rales.   Abdominal:      General: Bowel sounds are normal.      Palpations: Abdomen is soft.      Tenderness: There is no abdominal tenderness.   Musculoskeletal:      Cervical back: Normal range of motion and neck supple.      Right lower leg: No edema.      Left lower leg: No edema.      Comments: 5/5 strength throughout   Skin:     General: Skin is warm and dry.      Capillary Refill: Capillary refill takes less than 2 seconds.   Neurological:      General: No focal deficit present.      Mental Status: She is alert and oriented to person, place, and time. Mental status is at baseline.      GCS: GCS eye subscore is 4. GCS verbal subscore is 5. GCS motor subscore is 6.      Cranial Nerves: Cranial nerves 2-12 are intact.      Sensory: Sensation is intact.      Motor: Motor function is intact.   Psychiatric:         Mood and Affect: Mood normal.         Speech: Speech normal.         Behavior: Behavior  normal. Behavior is cooperative.              CRANIAL NERVES     CN III, IV, VI   Pupils are equal, round, and reactive to light.     LABS:  Recent Results (from the past 24 hours)   POCT glucose    Collection Time: 02/03/25 11:15 AM   Result Value Ref Range    POCT Glucose 475 (HH) 70 - 110 mg/dL   EKG 12-lead    Collection Time: 02/03/25 11:56 AM   Result Value Ref Range    QRS Duration 120 ms    [facility] QTC Calculation 533 ms   POCT glucose    Collection Time: 02/03/25 12:25 PM   Result Value Ref Range    POCT Glucose 421 (H) 70 - 110 mg/dL   CBC auto differential    Collection Time: 02/03/25 12:28 PM   Result Value Ref Range    WBC 8.36 3.90 - 12.70 K/uL    RBC 4.31 4.00 - 5.40 M/uL    Hemoglobin 12.8 12.0 - 16.0 g/dL    Hematocrit 39.6 37.0 - 48.5 %    MCV 92 82 - 98 fL    MCH 29.7 27.0 - 31.0 pg    MCHC 32.3 32.0 - 36.0 g/dL    RDW 13.2 11.5 - 14.5 %    Platelets 325 150 - 450 K/uL    MPV 10.0 9.2 - 12.9 fL    Immature Granulocytes 0.6 (H) 0.0 - 0.5 %    Gran # (ANC) 6.4 1.8 - 7.7 K/uL    Immature Grans (Abs) 0.05 (H) 0.00 - 0.04 K/uL    Lymph # 1.5 1.0 - 4.8 K/uL    Mono # 0.4 0.3 - 1.0 K/uL    Eos # 0.0 0.0 - 0.5 K/uL    Baso # 0.04 0.00 - 0.20 K/uL    nRBC 0 0 /100 WBC    Gran % 76.5 (H) 38.0 - 73.0 %    Lymph % 17.6 (L) 18.0 - 48.0 %    Mono % 4.7 4.0 - 15.0 %    Eosinophil % 0.1 0.0 - 8.0 %    Basophil % 0.5 0.0 - 1.9 %    Differential Method Automated    Comprehensive metabolic panel    Collection Time: 02/03/25 12:28 PM   Result Value Ref Range    Sodium 136 136 - 145 mmol/L    Potassium 4.5 3.5 - 5.1 mmol/L    Chloride 102 95 - 110 mmol/L    CO2 11 (L) 23 - 29 mmol/L    Glucose 444 (H) 70 - 110 mg/dL    BUN 13 8 - 23 mg/dL    Creatinine 1.1 0.5 - 1.4 mg/dL    Calcium 8.9 8.7 - 10.5 mg/dL    Total Protein 8.0 6.0 - 8.4 g/dL    Albumin 4.2 3.5 - 5.2 g/dL    Total Bilirubin 0.2 0.1 - 1.0 mg/dL    Alkaline Phosphatase 67 40 - 150 U/L    AST 11 10 - 40 U/L    ALT 16 10 - 44 U/L    eGFR 56 (A) >60 mL/min/1.73  m^2    Anion Gap 23 (H) 8 - 16 mmol/L   Beta - Hydroxybutyrate, Serum    Collection Time: 02/03/25 12:28 PM   Result Value Ref Range    Beta-Hydroxybutyrate 0.1 0.0 - 0.5 mmol/L   Magnesium    Collection Time: 02/03/25 12:28 PM   Result Value Ref Range    Magnesium 1.8 1.6 - 2.6 mg/dL   Hemoglobin A1c    Collection Time: 02/03/25 12:28 PM   Result Value Ref Range    Hemoglobin A1C 8.6 (H) 4.0 - 5.6 %    Estimated Avg Glucose 200 (H) 68 - 131 mg/dL   TSH    Collection Time: 02/03/25 12:28 PM   Result Value Ref Range    TSH <0.010 (L) 0.400 - 4.000 uIU/mL   T4, Free    Collection Time: 02/03/25 12:28 PM   Result Value Ref Range    Free T4 1.43 0.71 - 1.51 ng/dL   Lipase    Collection Time: 02/03/25 12:28 PM   Result Value Ref Range    Lipase 21 4 - 60 U/L   Ethanol    Collection Time: 02/03/25 12:28 PM   Result Value Ref Range    Alcohol, Serum <10 <10 mg/dL   ISTAT PROCEDURE    Collection Time: 02/03/25 12:32 PM   Result Value Ref Range    POC PH 7.301 (L) 7.35 - 7.45    POC PCO2 26.8 (L) 35 - 45 mmHg    POC PO2 58 40 - 60 mmHg    POC HCO3 13.2 (L) 24 - 28 mmol/L    POC BE -13 (L) -2 to 2 mmol/L    POC SATURATED O2 87 95 - 100 %    POC Glucose 437 (H) 70 - 110 mg/dL    POC Sodium 136 136 - 145 mmol/L    POC Potassium 4.5 3.5 - 5.1 mmol/L    POC Ionized Calcium 1.14 1.06 - 1.42 mmol/L    POC Hematocrit 39 36 - 54 %PCV    Sample VENOUS     Site Other     Allens Test N/A     DelSys Room Air     Mode SPONT     FiO2 21    Basic metabolic panel    Collection Time: 02/03/25  3:02 PM   Result Value Ref Range    Sodium 139 136 - 145 mmol/L    Potassium 5.0 3.5 - 5.1 mmol/L    Chloride 112 (H) 95 - 110 mmol/L    CO2 9 (LL) 23 - 29 mmol/L    Glucose 213 (H) 70 - 110 mg/dL    BUN 10 8 - 23 mg/dL    Creatinine 0.8 0.5 - 1.4 mg/dL    Calcium 8.3 (L) 8.7 - 10.5 mg/dL    Anion Gap 18 (H) 8 - 16 mmol/L    eGFR >60 >60 mL/min/1.73 m^2   Urinalysis, Reflex to Urine Culture Urine, Clean Catch    Collection Time: 02/03/25  3:07 PM     Specimen: Urine   Result Value Ref Range    Specimen UA Urine, Clean Catch     Color, UA Colorless (A) Yellow, Straw, Michelle    Appearance, UA Clear Clear    pH, UA 5.0 5.0 - 8.0    Specific Gravity, UA 1.015 1.005 - 1.030    Protein, UA Negative Negative    Glucose, UA 4+ (A) Negative    Ketones, UA Negative Negative    Bilirubin (UA) Negative Negative    Occult Blood UA Negative Negative    Nitrite, UA Negative Negative    Urobilinogen, UA Negative <2.0 EU/dL    Leukocytes, UA Negative Negative   Urinalysis Microscopic    Collection Time: 02/03/25  3:07 PM   Result Value Ref Range    WBC, UA 1 0 - 5 /hpf    Bacteria Rare None-Occ /hpf    Yeast, UA None None    Ca Oxalate Mai, UA Many (A) None-Moderate    Microscopic Comment SEE COMMENT    Drug screen panel, emergency    Collection Time: 02/03/25  3:08 PM   Result Value Ref Range    Benzodiazepines Negative Negative    Methadone metabolites Negative Negative    Cocaine (Metab.) Negative Negative    Opiate Scrn, Ur Negative Negative    Barbiturate Screen, Ur Negative Negative    Amphetamine Screen, Ur Negative Negative    THC Negative Negative    Phencyclidine Negative Negative    Creatinine, Urine 18.2 15.0 - 325.0 mg/dL    Toxicology Information SEE COMMENT    POCT glucose    Collection Time: 02/03/25  3:11 PM   Result Value Ref Range    POCT Glucose 215 (H) 70 - 110 mg/dL   Lactic acid, plasma    Collection Time: 02/03/25  4:34 PM   Result Value Ref Range    Lactate (Lactic Acid) 2.7 (H) 0.5 - 2.2 mmol/L   POCT glucose    Collection Time: 02/03/25  7:04 PM   Result Value Ref Range    POCT Glucose 105 70 - 110 mg/dL   POCT glucose    Collection Time: 02/03/25  9:50 PM   Result Value Ref Range    POCT Glucose 147 (H) 70 - 110 mg/dL       RADIOLOGY  CT Abdomen Pelvis With IV Contrast NO Oral Contrast    Result Date: 2/3/2025  EXAMINATION: CT ABDOMEN PELVIS WITH IV CONTRAST CLINICAL HISTORY: Nausea/vomiting; TECHNIQUE: Low dose axial images, sagittal and coronal  reformations were obtained from the lung bases to the pubic symphysis following the IV administration of 100 mL of Omnipaque 350. COMPARISON: None FINDINGS: Heart: Normal size as far as seen. No effusion as far as seen. Lung Bases: Clear. Liver: Fatty infiltration liver.  No focal lesions. Gallbladder: Status post cholecystectomy. Bile Ducts: No dilatation. Pancreas: No mass. No peripancreatic fat stranding. Spleen: Normal. Adrenals: Normal. Kidneys/Ureters: Normal enhancement.  No mass or  hydroureteronephrosis. Bladder: No wall thickening. Reproductive organs: Normal. GI Tract/Mesentery: No evidence of bowel obstruction or inflammation.  No evidence of acute appendicitis.  Mild GE junction thickening.  Degenerative joint disease. Peritoneal Space: No ascites or free air. Retroperitoneum: No significant adenopathy. Abdominal wall: Normal. Vasculature: No aneurysm.  Mild atherosclerotic changes Bones: No acute fracture.  Heterogeneous bone marrow such that underlying bone marrow process not excluded.  Recommend correlation to nuclear medicine exam.     No acute process.  Heterogeneous bone density of the axial skeleton may relate to underlying bone marrow process.  Consider correlation to nuclear medicine bone scan on a nonemergent basis.  Remaining findings as above. All CT scans at this facility use dose modulation, iterative reconstruction, and/or weight based dosing when appropriate to reduce radiation dose to as low as reasonably achievable. Electronically signed by: Mj Quezada Date:    02/03/2025 Time:    18:28    CT Head Without Contrast    Result Date: 2/3/2025  EXAMINATION: CT HEAD WITHOUT CONTRAST CLINICAL HISTORY: Dizziness, persistent/recurrent, cardiac or vascular cause suspected; TECHNIQUE: Low dose axial CT images obtained throughout the head without intravenous contrast. Sagittal and coronal reconstructions were performed. COMPARISON: 03/02/2020 FINDINGS: Intracranial compartment: The brain  parenchyma demonstrates areas of decreased attenuation with mild to moderate periventricular white matter consistent with chronic microvascular ischemic changes..  No parenchymal mass, hemorrhage, edema or major vascular distribution infarct.  Vascular calcifications are noted. Mild prominence of the sulci and ventricles are consistent with age-related involutional changes. No extra-axial blood or fluid collections. Skull/extracranial contents (limited evaluation): No fracture. Mastoid air cells and paranasal sinuses are essentially clear.     Chronic microvascular ischemic changes. All CT scans at this facility use dose modulation, iterative reconstruction, and/or weight based dosing when appropriate to reduce radiation dose to as low as reasonable achievable. Electronically signed by: James Noel MD Date:    02/03/2025 Time:    13:29    X-Ray Chest AP Portable    Result Date: 2/3/2025  EXAMINATION: XR CHEST AP PORTABLE CLINICAL HISTORY: hyperglycemia; FINDINGS: Single view of the chest.  Comparison 03/13/2023 Cardiac silhouette is normal.  The lungs demonstrate no evidence of active disease.  No evidence of pleural effusion or pneumothorax.  Bones appear intact.  Moderate degenerative changes and moderate atherosclerotic disease.     No acute process seen. Electronically signed by: James Noel MD Date:    02/03/2025 Time:    12:40      EKG    MICROBIOLOGY    MDM     Amount and/or Complexity of Data Reviewed  Clinical lab tests: reviewed  Tests in the radiology section of CPT®: reviewed  Tests in the medicine section of CPT®: reviewed  Discussion of test results with the performing providers: yes  Decide to obtain previous medical records or to obtain history from someone other than the patient: yes  Obtain history from someone other than the patient: yes  Review and summarize past medical records: yes  Discuss the patient with other providers: yes  Independent visualization of images, tracings, or specimens:  yes

## 2025-02-04 NOTE — DISCHARGE SUMMARY
"[facility] - Tele[facility] ([facility] Medicine  Discharge Summary      Patient Name: Birgit Tejada  MRN: [id number]  ALEX: [id number]  Patient Class: OP- Observation  Admission Date: 2/3/2025  Hospital Length of Stay: 0 days  Discharge Date and Time:  02/04/2025 12:05 PM  Attending Physician: Chele Ambrosio,*   Discharging Provider: Chele Ambrosio MD  Primary Care Provider: Ryan Gu MD    Primary Care Team: Networked reference to record PCT     HPI:   Birgit Tejada is a 64 y.o. female with a PMH  has a past medical history of Diabetes mellitus and Hypertension.presented to the Emergency Department for evaluation of elevated blood glucose which onset gradually throughout the past day. Pt's blood glucose was over 500 this morning; pt reports glucose has been running high since yesterday. Pt's last insulin intake was yesterday night. Reports being unable to take insulin this morning. Pt denies eating any food, however, she did drink root beer and coffee prior to going to work at [employer]. Pt reports that her blood glucose normally runs in the 200s. Symptoms are constant and moderate in severity. No mitigating or exacerbating factors reported. Associated sxs include dizziness and described it as feeling "drunk." Pt denies drinking any EtOH this morning. Pt reports she did drink wine and only ate crawfish yesterday. Patient denies any dysuria, abd pain, fever, CP, SOB, N/V, and all other sxs at this time. No further complaints or concerns at this time.     ER workup revealed CBC to be unremarkable.  CMP revealed CBG of 444 mg/dL with a anion gap of 23.  Repeat CBG of 213 mg/dL with anion gap of 18 after receiving treatment.  A1c 8.6.  Beta hydroxybutyrate 0.1.  UA and drug screen unremarkable.  VBG revealed pH of 7.301, pCO2 of 26.8, PO2 of 58, HC03 of 13.2.  Chest x-ray negative for acute findings.  EKG revealed sinus tach with incomplete left bundle-branch block with a ventricular rate " "of 115 beats per minute and a QT/QTC of 386/533.  CT head negative for acute findings.  Patient received 3 L of normal saline, 8 units regular insulin IV, 8 units regular insulin subQ, and 100 mEq of sodium bicarbonate IVPB while in ER.  ICU consulted.  Patient is suitable for floor due to labs improving.  Hospital Medicine consulted to admit patient for hyperglycemia.  Patient in agreement with treatment plan.  Patient admitted under observation status.    PCP: No primary care provider on file.      * No surgery found *      Hospital Course:   Birgit Tejada is a 64 y.o. female with a PMH  has a past medical history of Diabetes mellitus and Hypertension.presented to the Emergency Department for evaluation of elevated blood glucose which onset gradually throughout the past day. Pt's blood glucose was over 500 this morning; pt reports glucose has been running high since yesterday. Pt's last insulin intake was yesterday night. Reports being unable to take insulin this morning. Pt denies eating any food, however, she did drink root beer and coffee prior to going to work at [employer]. Pt reports that her blood glucose normally runs in the 200s. Symptoms are constant and moderate in severity. No mitigating or exacerbating factors reported. Associated sxs include dizziness and described it as feeling "drunk." Pt denies drinking any EtOH this morning. Pt reports she did drink wine and only ate crawfish yesterday. Patient denies any dysuria, abd pain, fever, CP, SOB, N/V, and all other sxs at this time. No further complaints or concerns at this time.      ER workup revealed CBC to be unremarkable.  CMP revealed CBG of 444 mg/dL with a anion gap of 23.  Repeat CBG of 213 mg/dL with anion gap of 18 after receiving treatment.  A1c 8.6.  Beta hydroxybutyrate 0.1.  UA and drug screen unremarkable.  VBG revealed pH of 7.301, pCO2 of 26.8, PO2 of 58, HC03 of 13.2.  Chest x-ray negative for acute findings.  EKG revealed sinus tach " with incomplete left bundle-branch block with a ventricular rate of 115 beats per minute and a QT/QTC of 386/533.  CT head negative for acute findings.  Patient received 3 L of normal saline, 8 units regular insulin IV, 8 units regular insulin subQ, and 100 mEq of sodium bicarbonate IVPB while in ER.  ICU consulted.  Patient is suitable for floor due to labs improving.  Hospital Medicine consulted to admit patient for hyperglycemia.  Patient in agreement with treatment plan.  Patient admitted under observation status.       2/4/25  Examination done at bedside, appeared alert and oriented x3   Denied acute events overnight   Denied fever, chills, chest pain, shortness O, nausea, vomiting, bowel or bladder issues   Appeared hemodynamically stable, afebrile, no leukocytosis   Blood glucose trended down to 200s metabolic acidosis resolved, anion gap trended down   Received IV fluids, insulin, with good response in labs   Patient remained asymptomatic   Had diabetic educator at bedside, ordered outpatient diabetic educator follow up considering clinical and hemodynamic stability, planning to discharge patient today, emphasized on compliance with medications, outpatient follow-up visits, patient/family at bedside expressed understanding, agreed with the plan   Stated having enough supply of insulin at home   Follows with Endocrinology OP, emphasized on outpatient follow up visits     Goals of Care Treatment Preferences:  Code Status: Full Code         Consults:   Consults (From admission, onward)          Status Ordering Provider     Inpatient consult to Diabetes educator  Once        Provider:  (Not yet assigned)    Completed ELOY ELIZONDO.            * Hyperglycemia due to diabetes mellitus  Patient's FSGs are uncontrolled due to hyperglycemia on current medication regimen.  Last A1c reviewed-   Lab Results   Component Value Date    HGBA1C 8.6 (H) 02/03/2025     Most recent fingerstick glucose reviewed-   Recent  Labs   Lab 02/03/25  1225 02/03/25  1511 02/03/25  1904 02/03/25  2150   POCTGLUCOSE 421* 215* 105 147*     Current correctional scale  Low  Maintain anti-hyperglycemic dose as follows-   Antihyperglycemics (From admission, onward)      Start     Stop Route Frequency Ordered    02/04/25 0900  insulin glargine U-100 (Lantus) pen 20 Units         -- SubQ 2 times daily 02/04/25 0132    02/03/25 2026  insulin aspart U-100 pen 0-5 Units         -- SubQ Before meals & nightly PRN 02/03/25 1927          Plan:  -SSI  -A1c  -Accu-checks  -Hold oral hypoglycemics while patient is in the hospital  -Continue home long-acting insulin at 25-50% decrease, titrate up as needed  -Hypoglycemic protocol          Hypothyroid  Patient has chronic hypothyroidism. TFTs reviewed-   Lab Results   Component Value Date    TSH <0.010 (L) 02/03/2025   . Will continue chronic levothyroxine and adjust for and clinical changes.        HTN (hypertension)  Patient's blood pressure range in the last 24 hours was: BP  Min: 135/66  Max: 167/86.The patient's inpatient anti-hypertensive regimen is listed below:  Current Antihypertensives  , Daily, Oral  , Daily, Oral  , Daily, Oral  amLODIPine tablet 5 mg, Daily, Oral  furosemide tablet 20 mg, Daily, Oral  lisinopriL tablet 5 mg, Daily, Oral    Plan  - BP is controlled, no changes needed to their regimen        Final Active Diagnoses:    Diagnosis Date Noted POA    PRINCIPAL PROBLEM:  Hyperglycemia due to diabetes mellitus [E11.65] 02/04/2025 Unknown    HTN (hypertension) [I10] 02/04/2025 Unknown    Hypothyroid [E03.9] 02/04/2025 Unknown      Problems Resolved During this Admission:       Discharged Condition: fair    Disposition: Home or Self Care    Follow Up:   Follow-up Information       SayRyan MD. Schedule an appointment as soon as possible for a visit in 3 day(s).    Specialty: Internal Medicine  Contact information:  [address] [facility] [address] [phone]                Ryan Gu MD Follow up in 1 week(s).    Specialty: Internal Medicine  Contact information:  [address] [facility] [address] [phone]                           Patient Instructions:      Ambulatory referral/consult to Diabetes Education   Standing Status: Future   Referral Priority: Routine Referral Type: Consultation   Referral Reason: Specialty Services Required   Requested Specialty: Diabetes   Number of Visits Requested: 1 Expiration Date: 02/04/26     Ambulatory referral/consult to Diabetic Advanced Practice Providers (Medical Management)   Standing Status: Future   Referral Priority: Routine Referral Type: Consultation   Referral Reason: Specialty Services Required   Requested Specialty: Endocrinology   Number of Visits Requested: 1       Significant Diagnostic Studies:     Results for orders placed or performed during the hospital encounter of 02/03/25   POCT glucose    Collection Time: 02/03/25 11:15 AM   Result Value Ref Range    POCT Glucose 475 (HH) 70 - 110 mg/dL   EKG 12-lead    Collection Time: 02/03/25 11:56 AM   Result Value Ref Range    QRS Duration 120 ms    [facility] QTC Calculation 533 ms   POCT glucose    Collection Time: 02/03/25 12:25 PM   Result Value Ref Range    POCT Glucose 421 (H) 70 - 110 mg/dL   CBC auto differential    Collection Time: 02/03/25 12:28 PM   Result Value Ref Range    WBC 8.36 3.90 - 12.70 K/uL    RBC 4.31 4.00 - 5.40 M/uL    Hemoglobin 12.8 12.0 - 16.0 g/dL    Hematocrit 39.6 37.0 - 48.5 %    MCV 92 82 - 98 fL    MCH 29.7 27.0 - 31.0 pg    MCHC 32.3 32.0 - 36.0 g/dL    RDW 13.2 11.5 - 14.5 %    Platelets 325 150 - 450 K/uL    MPV 10.0 9.2 - 12.9 fL    Immature Granulocytes 0.6 (H) 0.0 - 0.5 %    Gran # (ANC) 6.4 1.8 - 7.7 K/uL    Immature Grans (Abs) 0.05 (H) 0.00 - 0.04 K/uL    Lymph # 1.5 1.0 - 4.8 K/uL    Mono # 0.4 0.3 - 1.0 K/uL    Eos # 0.0 0.0 - 0.5 K/uL    Baso # 0.04 0.00 - 0.20 K/uL    nRBC 0 0 /100 WBC    Gran % 76.5 (H) 38.0 - 73.0 %    Lymph  % 17.6 (L) 18.0 - 48.0 %    Mono % 4.7 4.0 - 15.0 %    Eosinophil % 0.1 0.0 - 8.0 %    Basophil % 0.5 0.0 - 1.9 %    Differential Method Automated    Comprehensive metabolic panel    Collection Time: 02/03/25 12:28 PM   Result Value Ref Range    Sodium 136 136 - 145 mmol/L    Potassium 4.5 3.5 - 5.1 mmol/L    Chloride 102 95 - 110 mmol/L    CO2 11 (L) 23 - 29 mmol/L    Glucose 444 (H) 70 - 110 mg/dL    BUN 13 8 - 23 mg/dL    Creatinine 1.1 0.5 - 1.4 mg/dL    Calcium 8.9 8.7 - 10.5 mg/dL    Total Protein 8.0 6.0 - 8.4 g/dL    Albumin 4.2 3.5 - 5.2 g/dL    Total Bilirubin 0.2 0.1 - 1.0 mg/dL    Alkaline Phosphatase 67 40 - 150 U/L    AST 11 10 - 40 U/L    ALT 16 10 - 44 U/L    eGFR 56 (A) >60 mL/min/1.73 m^2    Anion Gap 23 (H) 8 - 16 mmol/L   Beta - Hydroxybutyrate, Serum    Collection Time: 02/03/25 12:28 PM   Result Value Ref Range    Beta-Hydroxybutyrate 0.1 0.0 - 0.5 mmol/L   Magnesium    Collection Time: 02/03/25 12:28 PM   Result Value Ref Range    Magnesium 1.8 1.6 - 2.6 mg/dL   Hemoglobin A1c    Collection Time: 02/03/25 12:28 PM   Result Value Ref Range    Hemoglobin A1C 8.6 (H) 4.0 - 5.6 %    Estimated Avg Glucose 200 (H) 68 - 131 mg/dL   TSH    Collection Time: 02/03/25 12:28 PM   Result Value Ref Range    TSH <0.010 (L) 0.400 - 4.000 uIU/mL   T4, Free    Collection Time: 02/03/25 12:28 PM   Result Value Ref Range    Free T4 1.43 0.71 - 1.51 ng/dL   Lipase    Collection Time: 02/03/25 12:28 PM   Result Value Ref Range    Lipase 21 4 - 60 U/L   Ethanol    Collection Time: 02/03/25 12:28 PM   Result Value Ref Range    Alcohol, Serum <10 <10 mg/dL   ISTAT PROCEDURE    Collection Time: 02/03/25 12:32 PM   Result Value Ref Range    POC PH 7.301 (L) 7.35 - 7.45    POC PCO2 26.8 (L) 35 - 45 mmHg    POC PO2 58 40 - 60 mmHg    POC HCO3 13.2 (L) 24 - 28 mmol/L    POC BE -13 (L) -2 to 2 mmol/L    POC SATURATED O2 87 95 - 100 %    POC Glucose 437 (H) 70 - 110 mg/dL    POC Sodium 136 136 - 145 mmol/L    POC Potassium  4.5 3.5 - 5.1 mmol/L    POC Ionized Calcium 1.14 1.06 - 1.42 mmol/L    POC Hematocrit 39 36 - 54 %PCV    Sample VENOUS     Site Other     Allens Test N/A     DelSys Room Air     Mode SPONT     FiO2 21    Basic metabolic panel    Collection Time: 02/03/25  3:02 PM   Result Value Ref Range    Sodium 139 136 - 145 mmol/L    Potassium 5.0 3.5 - 5.1 mmol/L    Chloride 112 (H) 95 - 110 mmol/L    CO2 9 (LL) 23 - 29 mmol/L    Glucose 213 (H) 70 - 110 mg/dL    BUN 10 8 - 23 mg/dL    Creatinine 0.8 0.5 - 1.4 mg/dL    Calcium 8.3 (L) 8.7 - 10.5 mg/dL    Anion Gap 18 (H) 8 - 16 mmol/L    eGFR >60 >60 mL/min/1.73 m^2   Urinalysis, Reflex to Urine Culture Urine, Clean Catch    Collection Time: 02/03/25  3:07 PM    Specimen: Urine   Result Value Ref Range    Specimen UA Urine, Clean Catch     Color, UA Colorless (A) Yellow, Straw, Michelle    Appearance, UA Clear Clear    pH, UA 5.0 5.0 - 8.0    Specific Gravity, UA 1.015 1.005 - 1.030    Protein, UA Negative Negative    Glucose, UA 4+ (A) Negative    Ketones, UA Negative Negative    Bilirubin (UA) Negative Negative    Occult Blood UA Negative Negative    Nitrite, UA Negative Negative    Urobilinogen, UA Negative <2.0 EU/dL    Leukocytes, UA Negative Negative   Urinalysis Microscopic    Collection Time: 02/03/25  3:07 PM   Result Value Ref Range    WBC, UA 1 0 - 5 /hpf    Bacteria Rare None-Occ /hpf    Yeast, UA None None    Ca Oxalate Mai, UA Many (A) None-Moderate    Microscopic Comment SEE COMMENT    Drug screen panel, emergency    Collection Time: 02/03/25  3:08 PM   Result Value Ref Range    Benzodiazepines Negative Negative    Methadone metabolites Negative Negative    Cocaine (Metab.) Negative Negative    Opiate Scrn, Ur Negative Negative    Barbiturate Screen, Ur Negative Negative    Amphetamine Screen, Ur Negative Negative    THC Negative Negative    Phencyclidine Negative Negative    Creatinine, Urine 18.2 15.0 - 325.0 mg/dL    Toxicology Information SEE COMMENT    POCT  glucose    Collection Time: 02/03/25  3:11 PM   Result Value Ref Range    POCT Glucose 215 (H) 70 - 110 mg/dL   Lactic acid, plasma    Collection Time: 02/03/25  4:34 PM   Result Value Ref Range    Lactate (Lactic Acid) 2.7 (H) 0.5 - 2.2 mmol/L   POCT glucose    Collection Time: 02/03/25  7:04 PM   Result Value Ref Range    POCT Glucose 105 70 - 110 mg/dL   POCT glucose    Collection Time: 02/03/25  9:50 PM   Result Value Ref Range    POCT Glucose 147 (H) 70 - 110 mg/dL   POCT glucose    Collection Time: 02/04/25  5:32 AM   Result Value Ref Range    POCT Glucose 242 (H) 70 - 110 mg/dL   Basic metabolic panel    Collection Time: 02/04/25  6:34 AM   Result Value Ref Range    Sodium 141 136 - 145 mmol/L    Potassium 3.8 3.5 - 5.1 mmol/L    Chloride 110 95 - 110 mmol/L    CO2 22 (L) 23 - 29 mmol/L    Glucose 242 (H) 70 - 110 mg/dL    BUN 11 8 - 23 mg/dL    Creatinine 0.8 0.5 - 1.4 mg/dL    Calcium 7.9 (L) 8.7 - 10.5 mg/dL    Anion Gap 9 8 - 16 mmol/L    eGFR >60 >60 mL/min/1.73 m^2        Imaging Results              CT Abdomen Pelvis With IV Contrast NO Oral Contrast (Final result)  Result time 02/03/25 18:28:33      Final result by Mj Quezada MD (02/03/25 18:28:33)                   Impression:      No acute process.  Heterogeneous bone density of the axial skeleton may relate to underlying bone marrow process.  Consider correlation to nuclear medicine bone scan on a nonemergent basis.  Remaining findings as above.    All CT scans at this facility use dose modulation, iterative reconstruction, and/or weight based dosing when appropriate to reduce radiation dose to as low as reasonably achievable.      Electronically signed by: Mj Quezada  Date:    02/03/2025  Time:    18:28               Narrative:    EXAMINATION:  CT ABDOMEN PELVIS WITH IV CONTRAST    CLINICAL HISTORY:  Nausea/vomiting;    TECHNIQUE:  Low dose axial images, sagittal and coronal reformations were obtained from the lung bases to the pubic  symphysis following the IV administration of 100 mL of Omnipaque 350.    COMPARISON:  None    FINDINGS:  Heart: Normal size as far as seen. No effusion as far as seen.    Lung Bases: Clear.    Liver: Fatty infiltration liver.  No focal lesions.    Gallbladder: Status post cholecystectomy.    Bile Ducts: No dilatation.    Pancreas: No mass. No peripancreatic fat stranding.    Spleen: Normal.    Adrenals: Normal.    Kidneys/Ureters: Normal enhancement.  No mass or  hydroureteronephrosis.    Bladder: No wall thickening.    Reproductive organs: Normal.    GI Tract/Mesentery: No evidence of bowel obstruction or inflammation.  No evidence of acute appendicitis.  Mild GE junction thickening.  Degenerative joint disease.    Peritoneal Space: No ascites or free air.    Retroperitoneum: No significant adenopathy.    Abdominal wall: Normal.    Vasculature: No aneurysm.  Mild atherosclerotic changes    Bones: No acute fracture.  Heterogeneous bone marrow such that underlying bone marrow process not excluded.  Recommend correlation to nuclear medicine exam.                                       CT Head Without Contrast (Final result)  Result time 02/03/25 13:29:55      Final result by James Noel MD (02/03/25 13:29:55)                   Impression:      Chronic microvascular ischemic changes.    All CT scans at this facility use dose modulation, iterative reconstruction, and/or weight based dosing when appropriate to reduce radiation dose to as low as reasonable achievable.      Electronically signed by: James Noel MD  Date:    02/03/2025  Time:    13:29               Narrative:    EXAMINATION:  CT HEAD WITHOUT CONTRAST    CLINICAL HISTORY:  Dizziness, persistent/recurrent, cardiac or vascular cause suspected;    TECHNIQUE:  Low dose axial CT images obtained throughout the head without intravenous contrast. Sagittal and coronal reconstructions were performed.    COMPARISON:  03/02/2020    FINDINGS:  Intracranial  compartment:    The brain parenchyma demonstrates areas of decreased attenuation with mild to moderate periventricular white matter consistent with chronic microvascular ischemic changes..  No parenchymal mass, hemorrhage, edema or major vascular distribution infarct.  Vascular calcifications are noted.    Mild prominence of the sulci and ventricles are consistent with age-related involutional changes.    No extra-axial blood or fluid collections.    Skull/extracranial contents (limited evaluation): No fracture. Mastoid air cells and paranasal sinuses are essentially clear.                                       X-Ray Chest AP Portable (Final result)  Result time 02/03/25 12:40:48      Final result by James Noel MD (02/03/25 12:40:48)                   Impression:      No acute process seen.      Electronically signed by: James Noel MD  Date:    02/03/2025  Time:    12:40               Narrative:    EXAMINATION:  XR CHEST AP PORTABLE    CLINICAL HISTORY:  hyperglycemia;    FINDINGS:  Single view of the chest.  Comparison 03/13/2023    Cardiac silhouette is normal.  The lungs demonstrate no evidence of active disease.  No evidence of pleural effusion or pneumothorax.  Bones appear intact.  Moderate degenerative changes and moderate atherosclerotic disease.                                       Pending Diagnostic Studies:       None           Medications:       Medication List        CONTINUE taking these medications      amLODIPine 5 MG tablet  Commonly known as: NORVASC     furosemide 20 MG tablet  Commonly known as: LASIX     insulin detemir U-100 100 unit/mL injection  Commonly known as: Levemir     levothyroxine 125 MCG tablet  Commonly known as: SYNTHROID     lisinopriL 5 MG tablet  Commonly known as: PRINIVIL,ZESTRIL     metFORMIN 500 MG ER 24hr tablet  Commonly known as: GLUCOPHAGE-XR     MOUNJARO 2.5 mg/0.5 mL Pnij  Generic drug: tirzepatide               Indwelling Lines/Drains at time of discharge:    Lines/Drains/Airways       None                   Time spent on the discharge of patient: 90 minutes         Chele Ambrosio MD  Department of Hospital Medicine  [facility] - Telemetry ([facility]

## 2025-02-04 NOTE — ASSESSMENT & PLAN NOTE
Patient has chronic hypothyroidism. TFTs reviewed-   Lab Results   Component Value Date    TSH <0.010 (L) 02/03/2025   . Will continue chronic levothyroxine and adjust for and clinical changes.

## 2025-02-04 NOTE — HOSPITAL COURSE
"Birgit Tejada is a 64 y.o. female with a PMH  has a past medical history of Diabetes mellitus and Hypertension.presented to the Emergency Department for evaluation of elevated blood glucose which onset gradually throughout the past day. Pt's blood glucose was over 500 this morning; pt reports glucose has been running high since yesterday. Pt's last insulin intake was yesterday night. Reports being unable to take insulin this morning. Pt denies eating any food, however, she did drink root beer and coffee prior to going to work at [employer]. Pt reports that her blood glucose normally runs in the 200s. Symptoms are constant and moderate in severity. No mitigating or exacerbating factors reported. Associated sxs include dizziness and described it as feeling "drunk." Pt denies drinking any EtOH this morning. Pt reports she did drink wine and only ate crawfish yesterday. Patient denies any dysuria, abd pain, fever, CP, SOB, N/V, and all other sxs at this time. No further complaints or concerns at this time.      ER workup revealed CBC to be unremarkable.  CMP revealed CBG of 444 mg/dL with a anion gap of 23.  Repeat CBG of 213 mg/dL with anion gap of 18 after receiving treatment.  A1c 8.6.  Beta hydroxybutyrate 0.1.  UA and drug screen unremarkable.  VBG revealed pH of 7.301, pCO2 of 26.8, PO2 of 58, HC03 of 13.2.  Chest x-ray negative for acute findings.  EKG revealed sinus tach with incomplete left bundle-branch block with a ventricular rate of 115 beats per minute and a QT/QTC of 386/533.  CT head negative for acute findings.  Patient received 3 L of normal saline, 8 units regular insulin IV, 8 units regular insulin subQ, and 100 mEq of sodium bicarbonate IVPB while in ER.  ICU consulted.  Patient is suitable for floor due to labs improving.  Hospital Medicine consulted to admit patient for hyperglycemia.  Patient in agreement with treatment plan.  Patient admitted under observation status.       2/4/25  Examination " done at bedside, appeared alert and oriented x3   Denied acute events overnight   Denied fever, chills, chest pain, shortness O, nausea, vomiting, bowel or bladder issues   Appeared hemodynamically stable, afebrile, no leukocytosis   Blood glucose trended down to 200s metabolic acidosis resolved, anion gap trended down   Received IV fluids, insulin, with good response in labs   Patient remained asymptomatic   Had diabetic educator at bedside, ordered outpatient diabetic educator follow up considering clinical and hemodynamic stability, planning to discharge patient today, emphasized on compliance with medications, outpatient follow-up visits, patient/family at bedside expressed understanding, agreed with the plan   Stated having enough supply of insulin at home   Follows with Endocrinology OP, emphasized on outpatient follow up visits

## 2025-02-14 LAB
POCT GLUCOSE: 112 MG/DL (ref 70–110)
POCT GLUCOSE: 285 MG/DL (ref 70–110)

## 2025-07-15 ENCOUNTER — HOSPITAL ENCOUNTER (OUTPATIENT)
Dept: RADIOLOGY | Facility: HOSPITAL | Age: 65
Discharge: HOME | End: 2025-07-15
Attending: PHYSICIAN ASSISTANT
Payer: MEDICAID

## 2025-07-15 DIAGNOSIS — M25.511 PAIN IN RIGHT SHOULDER: ICD-10-CM

## 2025-07-15 DIAGNOSIS — M54.31 BILATERAL SCIATICA: ICD-10-CM

## 2025-07-15 DIAGNOSIS — M54.2 CERVICALGIA: ICD-10-CM

## 2025-07-15 DIAGNOSIS — M54.32 BILATERAL SCIATICA: ICD-10-CM

## 2025-07-15 PROCEDURE — 72110 X-RAY EXAM L-2 SPINE 4/>VWS: CPT | Mod: TC

## 2025-07-15 PROCEDURE — 72110 X-RAY EXAM L-2 SPINE 4/>VWS: CPT | Mod: 26,,, | Performed by: RADIOLOGY

## 2025-07-15 PROCEDURE — 73030 X-RAY EXAM OF SHOULDER: CPT | Mod: 26,RT,, | Performed by: RADIOLOGY

## 2025-07-15 PROCEDURE — 72050 X-RAY EXAM NECK SPINE 4/5VWS: CPT | Mod: 26,,, | Performed by: RADIOLOGY

## 2025-07-15 PROCEDURE — 73030 X-RAY EXAM OF SHOULDER: CPT | Mod: TC,RT

## 2025-07-15 PROCEDURE — 72050 X-RAY EXAM NECK SPINE 4/5VWS: CPT | Mod: TC
